# Patient Record
Sex: MALE | Race: WHITE | NOT HISPANIC OR LATINO | Employment: OTHER | URBAN - METROPOLITAN AREA
[De-identification: names, ages, dates, MRNs, and addresses within clinical notes are randomized per-mention and may not be internally consistent; named-entity substitution may affect disease eponyms.]

---

## 2019-12-27 ENCOUNTER — OFFICE VISIT (OUTPATIENT)
Dept: ORTHOPEDIC SURGERY | Facility: CLINIC | Age: 64
End: 2019-12-27

## 2019-12-27 VITALS — WEIGHT: 202 LBS | TEMPERATURE: 98.4 F | HEIGHT: 71 IN | BODY MASS INDEX: 28.28 KG/M2

## 2019-12-27 DIAGNOSIS — M25.561 ACUTE PAIN OF RIGHT KNEE: Primary | ICD-10-CM

## 2019-12-27 PROCEDURE — 99203 OFFICE O/P NEW LOW 30 MIN: CPT | Performed by: ORTHOPAEDIC SURGERY

## 2019-12-27 PROCEDURE — 73562 X-RAY EXAM OF KNEE 3: CPT | Performed by: ORTHOPAEDIC SURGERY

## 2019-12-27 RX ORDER — PROPRANOLOL HYDROCHLORIDE 10 MG/1
TABLET ORAL
COMMUNITY
Start: 2019-11-19 | End: 2021-10-27 | Stop reason: SDUPTHER

## 2019-12-27 RX ORDER — CHLORTHALIDONE 25 MG/1
TABLET ORAL
COMMUNITY
Start: 2019-11-04 | End: 2021-10-27

## 2019-12-27 RX ORDER — FLUTICASONE PROPIONATE 50 MCG
SPRAY, SUSPENSION (ML) NASAL
COMMUNITY
Start: 2015-10-22

## 2019-12-27 RX ORDER — OLMESARTAN MEDOXOMIL 20 MG/1
TABLET ORAL
COMMUNITY
Start: 2019-07-29 | End: 2021-10-27 | Stop reason: SDUPTHER

## 2019-12-27 RX ORDER — PRAVASTATIN SODIUM 20 MG
20 TABLET ORAL DAILY
COMMUNITY
Start: 2019-01-30 | End: 2021-09-01

## 2019-12-27 RX ORDER — ALPRAZOLAM 0.25 MG/1
TABLET ORAL
COMMUNITY
Start: 2019-08-19

## 2019-12-27 RX ORDER — AMLODIPINE BESYLATE 5 MG/1
TABLET ORAL
COMMUNITY
Start: 2019-11-01 | End: 2021-10-27 | Stop reason: SDUPTHER

## 2019-12-27 RX ORDER — LEVOCETIRIZINE DIHYDROCHLORIDE 5 MG/1
TABLET, FILM COATED ORAL
COMMUNITY
Start: 2016-09-05

## 2019-12-27 RX ORDER — MULTIVITAMIN
1 TABLET ORAL DAILY
COMMUNITY

## 2019-12-27 NOTE — PROGRESS NOTES
"Patient: Leonid Pittman  YOB: 1955 64 y.o. male  Medical Record Number: [id number]    Chief Complaints:   Chief Complaint   Patient presents with   • Right Knee - Establish Care, Pain       History of Present Illness:Leonid Pittman is a 64 y.o. male who presents with complaints of right knee popping and catching.  This been ongoing for about a year.  He states he had a ski related injury about 30 years ago and ever since then he has had some occasional intermittent symptoms but over the past year he has had worsening popping with knee flexion.  If feel a pop or catch which is associated with mild to moderate pain.    Allergies: No Known Allergies    Medications:   Current Outpatient Medications   Medication Sig Dispense Refill   • ALPRAZolam (XANAX) 0.25 MG tablet TK 1 T PO BID PRN     • amLODIPine (NORVASC) 5 MG tablet      • aspirin 81 MG tablet Take 81 mg by mouth.     • chlorthalidone (HYGROTON) 25 MG tablet      • fluticasone (FLONASE) 50 MCG/ACT nasal spray      • levocetirizine (XYZAL) 5 MG tablet      • Multiple Vitamin (MULTIVITAMIN) tablet Take 1 tablet by mouth Daily.     • olmesartan (BENICAR) 20 MG tablet TAKE 1/2 TABLET BY MOUTH DAILY FOR A DOSE OF 10 MG     • pravastatin (PRAVACHOL) 20 MG tablet Take 20 mg by mouth Daily.     • propranolol (INDERAL) 10 MG tablet TK 1 T PO  BID PRF TREMORS       No current facility-administered medications for this visit.          The following portions of the patient's history were reviewed and updated as appropriate: allergies, current medications, past family history, past medical history, past social history, past surgical history and problem list.    Review of Systems:   A 14 point review of systems was performed. All systems negative except pertinent positives/negative listed in HPI above    Physical Exam:   Vitals:    12/27/19 1606   Temp: 98.4 °F (36.9 °C)   TempSrc: Temporal   Weight: 91.6 kg (202 lb)   Height: 180.3 cm (71\")       General: A and " O x 3, ASA, NAD    SCLERA:    Normal    DENTITION:   Normal   Knee:  right    ALIGNMENT:     Neutral  ,   Patella tracks   midline with crepitus    GAIT:     Nonantalgic    SKIN:    No abnormality    RANGE OF MOTION:   0  -  135   DEG    STRENGTH:   5 / 5    LIGAMENTS:    No varus / valgus instability.   Negative  Lachman.    MENISCUS:     Negative   Asim       DISTAL PULSES:    Paplable    DISTAL SENSATION :   Intact    LYMPHATICS:     No   lymphadenopathy    OTHER:          - No  effusion      + Anterior crepitance with ROM      - No Swelling /  tenderness to palpation pes anserine bursa       Radiology:  Xrays 3views right knee (ap,lateral, sunrise) were ordered and reviewed for evaluation of knee pain demonstrating no significant joint space narrowing there is some mild irregularity of the superior aspect of the patellar subchondral cartilage with some osteopenia and irregular surface contour seen most clearly on the lateral view.  There are no previous films for comparison    Assessment/Plan: Right anterior knee pain and catching possible cartilage defect of the patella.  I am going get an MRI he will call back for results.      Alan Zaragoza MD  12/27/2019

## 2020-01-16 ENCOUNTER — TELEPHONE (OUTPATIENT)
Dept: ORTHOPEDIC SURGERY | Facility: CLINIC | Age: 65
End: 2020-01-16

## 2020-01-17 ENCOUNTER — TELEPHONE (OUTPATIENT)
Dept: ORTHOPEDIC SURGERY | Facility: CLINIC | Age: 65
End: 2020-01-17

## 2020-01-17 NOTE — TELEPHONE ENCOUNTER
Pt wants to make sure you're aware that he is having NO pain in the knee and wants to know if you still think a cortisone injection would help with the clicking and popping since that is his main concern.

## 2020-01-17 NOTE — TELEPHONE ENCOUNTER
Let the patient know that the MRI of his knee does show moderate amount of cartilage wear behind his kneecap as well as what appears to be an old meniscus tear, nothing acute.  Would recommend cortisone injection with me in physical therapy if still having pain

## 2020-01-17 NOTE — TELEPHONE ENCOUNTER
Per Dr. Zaragoza's notes, patient was having pain as well.  That is wonderful that he is not having any further pain.  Cortisone would only be if the knee is painful.  We can certainly try a Synvisc injection instead if he is really not having any pain but his main concern is clicking and popping.

## 2020-01-20 NOTE — TELEPHONE ENCOUNTER
Called and LVM relaying message from Chanda Monteiro. Told him to call office back so we can decide if he wants the injection or not.

## 2020-02-19 ENCOUNTER — HOSPITAL ENCOUNTER (OUTPATIENT)
Dept: GENERAL RADIOLOGY | Facility: HOSPITAL | Age: 65
Discharge: HOME | End: 2020-02-19
Admitting: FAMILY MEDICINE

## 2020-02-19 DIAGNOSIS — M54.2 NECK PAIN: ICD-10-CM

## 2020-02-19 PROCEDURE — 72050 X-RAY EXAM NECK SPINE 4/5VWS: CPT

## 2020-03-04 ENCOUNTER — TREATMENT (OUTPATIENT)
Dept: PHYSICAL THERAPY | Facility: CLINIC | Age: 65
End: 2020-03-04

## 2020-03-04 DIAGNOSIS — M54.2 PAIN, NECK: Primary | ICD-10-CM

## 2020-03-04 DIAGNOSIS — M43.6 NECK STIFFNESS: ICD-10-CM

## 2020-03-04 PROCEDURE — 97162 PT EVAL MOD COMPLEX 30 MIN: CPT | Performed by: PHYSICAL THERAPIST

## 2020-03-04 PROCEDURE — 97110 THERAPEUTIC EXERCISES: CPT | Performed by: PHYSICAL THERAPIST

## 2020-03-04 NOTE — PROGRESS NOTES
Physical Therapy Initial Evaluation and Plan of Care      Patient: Leonid Pittman   : 1955  Diagnosis/ICD-10 Code:  Pain, neck [M54.2]  Referring practitioner: Self Referring  Date of Initial Visit: 3/4/2020  Today's Date: 3/4/2020  Patient seen for 1 sessions           Subjective Evaluation    History of Present Illness  Onset date: chronic.  Mechanism of injury: Pt is a periodontist and orthodontist. He has had neck pain off and on over the years. He has had some PT for about a year, but really not much help. Has tried accupuncture (1x), dry needling, massage. The set up of his surgical area usually aggravates him. On occasion has some tingling into the L pinky. Most of the time he is rotated to the L with L sided neck pain and R sided LBP.       Patient Occupation: periodontist and orthodontist Quality of life: good    Pain  Current pain ratin  At worst pain ratin  Location: neck  Quality: sharp and tight  Exacerbated by: work duties, bike riding, turning to back up car, sometimes sleeping.    Hand dominance: right    Treatments  Current treatment: massage  Current treatment comments: acupuncture .     Patient Goals  Patient goals for therapy: decreased pain and increased motion             Objective       Postural Observations    Additional Postural Observation Details  Mild rounding of shoulders, forward head    Palpation   Left   Hypertonic in the levator scapulae, suboccipitals and upper trapezius.   Tenderness of the levator scapulae and upper trapezius.   Trigger point to levator scapulae and upper trapezius.     Right   Hypertonic in the levator scapulae, sternocleidomastoid, suboccipitals and upper trapezius.     Tenderness   Cervical Spine   Tenderness in the left 1st rib.     Additional Tenderness Details  Decreased mobility    Active Range of Motion     Additional Active Range of Motion Details  Cervical AROM:  Flexion=90%, pulling on the L  Extension=0%, painful and stiff  R  rotation=50%, pain on L  L rotation=10% pain on L   R lateral flexion=20%, pain on L  L lateral flexion=40%, pain on L    Passive Range of Motion     Additional Passive Range of Motion Details  Limited PROM of lower cervical, some pain with PA springing    Strength/Myotome Testing     Left Shoulder     Planes of Motion   Flexion: 4+   Abduction: 4+   External rotation at 0°: 5     Right Shoulder     Planes of Motion   Flexion: 5   Abduction: 5   External rotation at 0°: 5     Left Elbow   Flexion: 5  Extension: 4+    Right Elbow   Flexion: 5    Left Wrist/Hand   Wrist extension: 5  Wrist flexion: 5    Right Wrist/Hand   Wrist extension: 5  Wrist flexion: 5       See Exercise, Manual, and Modality Logs for complete treatment.     Functional outcome score: Neck Disability Index=20%    Exercises:  -supine cervical self rotational mobs with a towel, 5x 5 sec  -supine cervical nodding, 10x 5 sec hold  -shoulder rolls  -UT and levator stretching 3x20 sec        Assessment & Plan     Assessment  Impairments: abnormal or restricted ROM, activity intolerance, impaired physical strength, lacks appropriate home exercise program and pain with function  Assessment details: Leonid Pittman is a 64 y.o. year-old male referred to physical therapy for neck pain. He presents with an evolving clinical presentation.  He has comorbidities of a physical job and no known personal factors that may affect his progress in the plan of care. He has limited cervical AROM with pain on the L during most motions. He has decreased PROM of the mid to lower cervical spine, some pain with PA glides. His sensation is intact and symmetrical and no radicular symptoms today.  Pt would benefit from therapy to help improve his ability to drive, sleep, and perform work duties with less pain.    Prognosis: good  Functional Limitations: sleeping, uncomfortable because of pain, moving in bed, sitting and unable to perform repetitive tasks  Goals  Plan Goals: STG:  6 wks  1. Patient will be independent with education for symptom management, joint protection and strategies to minimize stress on affected tissues  2. Pt to improve pain to no more than 5/10 with ADLs and work    LT wks  1. Pt to improve pain to no more than 2/10 with ADLs and work  2. Pt to improve NDI score from 20% to 10% for overall functional improvement  3. Pt to improve cervical AROM in L rotation from 10% to 50% with no increased pain for greater ease with driving and surgeries at work  4. Pt to improve cervical extension to 25% without increased pain  5. Pt to be independent with HEP for symptom management and strengthening      Plan  Therapy options: will be seen for skilled physical therapy services  Planned modality interventions: high voltage pulsed current (pain management), TENS, traction and ultrasound  Other planned modality interventions: dry needling  Planned therapy interventions: abdominal trunk stabilization, ADL retraining, body mechanics training, flexibility, functional ROM exercises, home exercise program, IADL retraining, joint mobilization, manual therapy, neuromuscular re-education, postural training, soft tissue mobilization, spinal/joint mobilization, strengthening, stretching and therapeutic activities  Frequency: 1x week  Duration in weeks: 20  Treatment plan discussed with: patient        Timed:  Manual Therapy:         mins  71147;  Therapeutic Exercise:    10     mins  48796;     Neuromuscular Sunday:        mins  37107;    Therapeutic Activity:          mins  56134;     Gait Training:           mins  68441;     Ultrasound:          mins  13867;    Electrical Stimulation:         mins  73994 ( );  Iontophoresis         mins 58041  Dry Needling        mins      Untimed:  Electrical Stimulation:         mins  77074 ( );  Mechanical Traction:         mins  12998;     Timed Treatment:   10   mins   Total Treatment:     45   mins    PT SIGNATURE: Dolores Dexter,  PT   DATE TREATMENT INITIATED: 3/4/2020    Initial Certification  Certification Period: 6/2/2020  I certify that the therapy services are furnished while this patient is under my care.  The services outlined above are required by this patient, and will be reviewed every 90 days.     PHYSICIAN: Referring, Self      DATE:     Please sign and return via fax to  .. Thank you, [facility].

## 2020-03-11 ENCOUNTER — TREATMENT (OUTPATIENT)
Dept: PHYSICAL THERAPY | Facility: CLINIC | Age: 65
End: 2020-03-11

## 2020-03-11 DIAGNOSIS — M54.2 PAIN, NECK: Primary | ICD-10-CM

## 2020-03-11 DIAGNOSIS — M43.6 NECK STIFFNESS: ICD-10-CM

## 2020-03-11 PROCEDURE — DRYNDL PR CUSTOM DRY NEEDLING SELF PAY: Performed by: PHYSICAL THERAPIST

## 2020-03-11 PROCEDURE — 97140 MANUAL THERAPY 1/> REGIONS: CPT | Performed by: PHYSICAL THERAPIST

## 2020-03-11 NOTE — PROGRESS NOTES
Physical Therapy Daily Progress Note    Patient: Leonid Pittman   : 1955  Diagnosis/ICD-10 Code:  Pain, neck [M54.2]  Referring practitioner: Self Referring  Date of Initial Visit: Type: THERAPY  Noted: 3/4/2020  Today's Date: 3/11/2020  Patient seen for 2 sessions           Subjective Pt has had some pretty painful days/nights. When he woke up yesterday am, it was a 10/10.    Objective   See Exercise, Manual, and Modality Logs for complete treatment.     Manual:  STM to B UT, levator and cervical PVMs with suboccipital release, gentle upglides for rotation    Dry needling, using threading and direct techniques, obtaining written and verbal consent to treat after discussing benefits and risks.   Patient position during treatment: supine. Muscles treated: B UT and levator. Response: Multiple LTRs.Clean needle technique observed at all times, precautions for lung fields, neurovascular structures observed. Manual palpation and assessment performed before, during, and after session.          Assessment/Plan  Pt is had increased pain over the past couple of days, so much yesterday that he was unsure he could even work. Today the pain is 6/10 with motion restricted. He tolerated the dry needling well and had decreased pain and increased motion following the treatment         Timed:    Manual Therapy:    30     mins  32720;  Therapeutic Exercise:         mins  92909;     Neuromuscular Sunday:        mins  94927;    Therapeutic Activity:          mins  58909;     Gait Training:           mins  87982;     Ultrasound:          mins  96686;    Electrical Stimulation:         mins  23205 ( );  Iontophoresis         mins 26122;  Aquatic Therapy         mins 01745;  Dry Needling              12     mins    Untimed:  Electrical Stimulation:         mins  92810 ( );  Mechanical Traction:         mins  38784;     Timed Treatment:   42   mins   Total Treatment:     42   mins  Dolores Dexter, PT  Physical  Therapist

## 2020-03-18 ENCOUNTER — TREATMENT (OUTPATIENT)
Dept: PHYSICAL THERAPY | Facility: CLINIC | Age: 65
End: 2020-03-18

## 2020-03-18 DIAGNOSIS — M43.6 NECK STIFFNESS: ICD-10-CM

## 2020-03-18 DIAGNOSIS — M54.2 PAIN, NECK: Primary | ICD-10-CM

## 2020-03-18 PROCEDURE — 97140 MANUAL THERAPY 1/> REGIONS: CPT | Performed by: PHYSICAL THERAPIST

## 2020-03-18 PROCEDURE — DRYNDL PR CUSTOM DRY NEEDLING SELF PAY: Performed by: PHYSICAL THERAPIST

## 2020-03-18 NOTE — PROGRESS NOTES
Physical Therapy Daily Progress Note    Patient: Leonid Pittman   : 1955  Diagnosis/ICD-10 Code:  Pain, neck [M54.2]  Referring practitioner: Self Referring  Date of Initial Visit: Type: THERAPY  Noted: 3/4/2020  Today's Date: 3/18/2020  Patient seen for 3 sessions           Subjective  Pain level 6/10 today. Had a hair cut earlier and had a hard time leaving back into the bowl.     Objective   See Exercise, Manual, and Modality Logs for complete treatment.     Manual:  STM to B UT, levator and cervical PVMs with suboccipital release, ischemic pressure to the L UT to release trigger point       Dry needling, using threading and direct techniques, obtaining verbal consent to treat after discussing benefits and risks.   Patient position during treatment: supine. Muscles treated: B UT and levator. Response: Multiple LTRs.Clean needle technique observed at all times, precautions for lung fields, neurovascular structures observed. Manual palpation and assessment performed before, during, and after session.       Assessment/Plan  Pt was very sensitive on the L UT today prior to starting manual and dry needling. It did settle down after increasing the height of his head on the bed. He is responding well to the needling, helping to reduce the muscle tension and pain         Timed:    Manual Therapy:    30     mins  61563;  Therapeutic Exercise:         mins  13506;     Neuromuscular Sunday:        mins  37200;    Therapeutic Activity:          mins  68382;     Gait Training:           mins  59443;     Ultrasound:          mins  89415;    Electrical Stimulation:         mins  05711 ( );  Iontophoresis         mins 08289;  Aquatic Therapy         mins 39212;  Dry Needling              15     mins    Untimed:  Electrical Stimulation:         mins  16880 ( );  Mechanical Traction:         mins  32992;     Timed Treatment:   45   mins   Total Treatment:     45   mins  Dolores Dexter, PT  Physical  Therapist

## 2020-07-01 ENCOUNTER — DOCUMENTATION (OUTPATIENT)
Dept: PHYSICAL THERAPY | Facility: CLINIC | Age: 65
End: 2020-07-01

## 2020-07-01 DIAGNOSIS — M43.6 NECK STIFFNESS: ICD-10-CM

## 2020-07-01 DIAGNOSIS — M54.2 PAIN, NECK: Primary | ICD-10-CM

## 2021-09-01 ENCOUNTER — OFFICE VISIT (OUTPATIENT)
Dept: INTERNAL MEDICINE | Facility: CLINIC | Age: 66
End: 2021-09-01

## 2021-09-01 VITALS
HEART RATE: 65 BPM | BODY MASS INDEX: 25.86 KG/M2 | RESPIRATION RATE: 16 BRPM | DIASTOLIC BLOOD PRESSURE: 78 MMHG | WEIGHT: 184.7 LBS | TEMPERATURE: 97.3 F | OXYGEN SATURATION: 97 % | HEIGHT: 71 IN | SYSTOLIC BLOOD PRESSURE: 140 MMHG

## 2021-09-01 DIAGNOSIS — E78.2 MIXED HYPERLIPIDEMIA: ICD-10-CM

## 2021-09-01 DIAGNOSIS — F41.1 GAD (GENERALIZED ANXIETY DISORDER): ICD-10-CM

## 2021-09-01 DIAGNOSIS — I10 ESSENTIAL HYPERTENSION: ICD-10-CM

## 2021-09-01 DIAGNOSIS — H61.23 BILATERAL IMPACTED CERUMEN: Primary | ICD-10-CM

## 2021-09-01 PROCEDURE — 99204 OFFICE O/P NEW MOD 45 MIN: CPT | Performed by: INTERNAL MEDICINE

## 2021-09-01 RX ORDER — ZOLPIDEM TARTRATE 10 MG/1
10 TABLET ORAL NIGHTLY PRN
COMMUNITY
Start: 2021-08-26

## 2021-09-01 RX ORDER — ATORVASTATIN CALCIUM 40 MG/1
40 TABLET, FILM COATED ORAL DAILY
COMMUNITY
Start: 2021-04-21 | End: 2021-10-27 | Stop reason: SDUPTHER

## 2021-09-01 NOTE — PROGRESS NOTES
"Chief Complaint  Ear Fullness (RT EAR CLOGGED)    Subjective          Leonid Pittman presents to [facility]  Right ear fullness, feels clogged, some ringing, no pain; no fevers; present for last 1-2 weeks; never had issues before    HTN, well managed without side effects, no lows, no chest pain, sob, headaches, vision changes    HLD, doing well with meds, replacement    ALBIN, recent divorce, doing ok he states, rare use of xanax    Insomnia, related to recent stressors, doing well without issues, using ambien as needed      Objective   Vital Signs:   /78   Pulse 65   Temp 97.3 °F (36.3 °C)   Resp 16   Ht 180.3 cm (71\")   Wt 83.8 kg (184 lb 11.2 oz)   SpO2 97%   BMI 25.76 kg/m²     Physical Exam  Vitals and nursing note reviewed.   Constitutional:       General: He is not in acute distress.     Appearance: Normal appearance.   HENT:      Head: Normocephalic and atraumatic.      Right Ear: External ear normal. There is impacted cerumen.      Left Ear: External ear normal. There is impacted cerumen.      Nose: Nose normal.      Mouth/Throat:      Mouth: Mucous membranes are moist.      Pharynx: Oropharynx is clear.   Eyes:      Extraocular Movements: Extraocular movements intact.      Conjunctiva/sclera: Conjunctivae normal.      Pupils: Pupils are equal, round, and reactive to light.   Cardiovascular:      Rate and Rhythm: Normal rate and regular rhythm.      Pulses: Normal pulses.      Heart sounds: Normal heart sounds. No murmur heard.   No gallop.    Pulmonary:      Effort: Pulmonary effort is normal.      Breath sounds: Normal breath sounds.   Abdominal:      General: Abdomen is flat. Bowel sounds are normal. There is no distension.      Palpations: Abdomen is soft. There is no mass.      Tenderness: There is no abdominal tenderness.   Musculoskeletal:         General: No swelling. Normal range of motion.      Cervical back: Normal range of motion and " neck supple.   Skin:     General: Skin is warm and dry.      Findings: No rash.   Neurological:      General: No focal deficit present.      Mental Status: He is alert and oriented to person, place, and time. Mental status is at baseline.   Psychiatric:         Mood and Affect: Mood normal.         Behavior: Behavior normal.        Result Review :          Ear Cerumen Removal    Date/Time: 9/7/2021 9:12 PM  Performed by: Tulio Carbone MD  Authorized by: Tulio Carbone MD   Location details: left ear and right ear  Patient tolerance: patient tolerated the procedure well with no immediate complications  Procedure type: instrumentation and irrigation   Sedation:  Patient sedated: no              Assessment and Plan    Diagnoses and all orders for this visit:    1. Bilateral impacted cerumen (Primary)  -     Ear Cerumen Removal  - cerumen removed today, doing well without issues; no side effects after procedure, tolerated well  - discussed home management, prevention    2. Essential hypertension  - at goal today  - continue amlodipine 5mg, olmesartan 20mg, propranolol 10mg, chlorthalidone 25mg daily, doing well without issues or side effects  - conitnue home log, limit salt; increase exercise, diet    3. Mixed hyperlipidemia  - continue atorva 40mg daily without issues or side effects, will monitor closely    4. ALBIN (generalized anxiety disorder)  - conitnue xanax as needed, will like to limit long term use  - discuss further with getting records for review      Follow Up   No follow-ups on file.  Patient was given instructions and counseling regarding his condition or for health maintenance advice. Please see specific information pulled into the AVS if appropriate.

## 2021-09-07 PROCEDURE — 69210 REMOVE IMPACTED EAR WAX UNI: CPT | Performed by: INTERNAL MEDICINE

## 2021-10-27 ENCOUNTER — OFFICE VISIT (OUTPATIENT)
Dept: INTERNAL MEDICINE | Facility: CLINIC | Age: 66
End: 2021-10-27

## 2021-10-27 VITALS
OXYGEN SATURATION: 99 % | HEART RATE: 63 BPM | BODY MASS INDEX: 26.18 KG/M2 | SYSTOLIC BLOOD PRESSURE: 124 MMHG | HEIGHT: 71 IN | RESPIRATION RATE: 18 BRPM | TEMPERATURE: 97.2 F | DIASTOLIC BLOOD PRESSURE: 70 MMHG | WEIGHT: 187 LBS

## 2021-10-27 DIAGNOSIS — Z11.59 ENCOUNTER FOR HCV SCREENING TEST FOR LOW RISK PATIENT: ICD-10-CM

## 2021-10-27 DIAGNOSIS — Z11.4 ENCOUNTER FOR SCREENING FOR HIV: ICD-10-CM

## 2021-10-27 DIAGNOSIS — E78.2 MIXED HYPERLIPIDEMIA: ICD-10-CM

## 2021-10-27 DIAGNOSIS — C61 PROSTATE CANCER (HCC): ICD-10-CM

## 2021-10-27 DIAGNOSIS — Z00.00 WELL ADULT EXAM: Primary | ICD-10-CM

## 2021-10-27 DIAGNOSIS — I10 ESSENTIAL HYPERTENSION: ICD-10-CM

## 2021-10-27 LAB
BILIRUB BLD-MCNC: NEGATIVE MG/DL
CLARITY, POC: CLEAR
COLOR UR: YELLOW
EXPIRATION DATE: NORMAL
GLUCOSE UR STRIP-MCNC: NEGATIVE MG/DL
KETONES UR QL: NEGATIVE
LEUKOCYTE EST, POC: NEGATIVE
Lab: NORMAL
NITRITE UR-MCNC: NEGATIVE MG/ML
PH UR: 7.5 [PH] (ref 5–8)
PROT UR STRIP-MCNC: NEGATIVE MG/DL
RBC # UR STRIP: NEGATIVE /UL
SP GR UR: 1.02 (ref 1–1.03)
UROBILINOGEN UR QL: NORMAL

## 2021-10-27 PROCEDURE — 99396 PREV VISIT EST AGE 40-64: CPT | Performed by: INTERNAL MEDICINE

## 2021-10-27 PROCEDURE — 93000 ELECTROCARDIOGRAM COMPLETE: CPT | Performed by: INTERNAL MEDICINE

## 2021-10-27 PROCEDURE — 81003 URINALYSIS AUTO W/O SCOPE: CPT | Performed by: INTERNAL MEDICINE

## 2021-10-27 RX ORDER — PROPRANOLOL HYDROCHLORIDE 10 MG/1
10 TABLET ORAL 2 TIMES DAILY PRN
Qty: 180 TABLET | Refills: 2 | Status: SHIPPED | OUTPATIENT
Start: 2021-10-27 | End: 2022-05-25 | Stop reason: SDUPTHER

## 2021-10-27 RX ORDER — AMLODIPINE BESYLATE 5 MG/1
5 TABLET ORAL DAILY
Qty: 90 TABLET | Refills: 2 | Status: SHIPPED | OUTPATIENT
Start: 2021-10-27 | End: 2022-05-25 | Stop reason: SDUPTHER

## 2021-10-27 RX ORDER — OLMESARTAN MEDOXOMIL 20 MG/1
20 TABLET ORAL DAILY
Qty: 90 TABLET | Refills: 2 | Status: SHIPPED | OUTPATIENT
Start: 2021-10-27 | End: 2022-05-25 | Stop reason: SDUPTHER

## 2021-10-27 RX ORDER — TADALAFIL 5 MG/1
TABLET ORAL
COMMUNITY
Start: 2021-08-04 | End: 2022-05-25 | Stop reason: SDUPTHER

## 2021-10-27 RX ORDER — ATORVASTATIN CALCIUM 40 MG/1
40 TABLET, FILM COATED ORAL DAILY
Qty: 90 TABLET | Refills: 2 | Status: SHIPPED | OUTPATIENT
Start: 2021-10-27 | End: 2022-04-06

## 2021-10-27 NOTE — PROGRESS NOTES
"Chief Complaint   Patient presents with   • Annual Exam       Subjective   Leonid Pittman is a 66 y.o. male.     History of Present Illness     The following portions of the patient's history were reviewed and updated as appropriate: allergies, current medications, past family history, past medical history, past social history, past surgical history, and problem list.    Review of Systems      Objective   Body mass index is 26.08 kg/m².   Vitals:    10/27/21 1443   BP: 124/70   Pulse: 63   Resp: 18   Temp: 97.2 °F (36.2 °C)   SpO2: 99%   Weight: 84.8 kg (187 lb)   Height: 180.3 cm (71\")        ECG 12 Lead    Date/Time: 10/27/2021 8:33 PM  Performed by: Tulio Carbone MD  Authorized by: Tulio Carbone MD   Comparison: not compared with previous ECG   Previous ECG: no previous ECG available  Rhythm: sinus rhythm  Rate: normal  Conduction: conduction normal  ST Segments: ST segments normal  T Waves: T waves normal  QRS axis: normal  Other: no other findings    Clinical impression: normal ECG            Physical Exam  Vitals and nursing note reviewed.   Constitutional:       General: He is not in acute distress.     Appearance: Normal appearance.   HENT:      Head: Normocephalic and atraumatic.      Right Ear: External ear normal.      Left Ear: External ear normal.      Nose: Nose normal.      Mouth/Throat:      Mouth: Mucous membranes are moist.      Pharynx: Oropharynx is clear.   Eyes:      Extraocular Movements: Extraocular movements intact.      Conjunctiva/sclera: Conjunctivae normal.      Pupils: Pupils are equal, round, and reactive to light.   Cardiovascular:      Rate and Rhythm: Normal rate and regular rhythm.      Pulses: Normal pulses.      Heart sounds: Normal heart sounds. No murmur heard.  No gallop.    Pulmonary:      Effort: Pulmonary effort is normal.      Breath sounds: Normal breath sounds.   Abdominal:      General: Abdomen is flat. Bowel sounds are normal. There is no distension.      " Palpations: Abdomen is soft. There is no mass.      Tenderness: There is no abdominal tenderness.   Musculoskeletal:         General: No swelling. Normal range of motion.      Cervical back: Normal range of motion and neck supple.   Skin:     General: Skin is warm and dry.      Findings: No rash.   Neurological:      General: No focal deficit present.      Mental Status: He is alert and oriented to person, place, and time. Mental status is at baseline.   Psychiatric:         Mood and Affect: Mood normal.         Behavior: Behavior normal.           Current Outpatient Medications:   •  ALPRAZolam (XANAX) 0.25 MG tablet, TK 1 T PO BID PRN, Disp: , Rfl:   •  amLODIPine (NORVASC) 5 MG tablet, Take 1 tablet by mouth Daily., Disp: 90 tablet, Rfl: 2  •  aspirin 81 MG tablet, Take 81 mg by mouth., Disp: , Rfl:   •  atorvastatin (LIPITOR) 40 MG tablet, Take 1 tablet by mouth Daily., Disp: 90 tablet, Rfl: 2  •  fluticasone (FLONASE) 50 MCG/ACT nasal spray, , Disp: , Rfl:   •  levocetirizine (XYZAL) 5 MG tablet, , Disp: , Rfl:   •  Multiple Vitamin (MULTIVITAMIN) tablet, Take 1 tablet by mouth Daily., Disp: , Rfl:   •  olmesartan (BENICAR) 20 MG tablet, Take 1 tablet by mouth Daily., Disp: 90 tablet, Rfl: 2  •  propranolol (INDERAL) 10 MG tablet, Take 1 tablet by mouth 2 (Two) Times a Day As Needed (tremor)., Disp: 180 tablet, Rfl: 2  •  tadalafil (CIALIS) 5 MG tablet, , Disp: , Rfl:   •  zolpidem (AMBIEN) 10 MG tablet, Take 10 mg by mouth At Night As Needed., Disp: , Rfl:      Lab Results   Component Value Date    HGBA1C 5.6 01/06/2020    TSH 1.250 07/28/2021    PSFL85GT 74.1 11/09/2019    PSA 0.01 01/27/2021        Health Maintenance   Topic Date Due   • INFLUENZA VACCINE  10/27/2022 (Originally 8/1/2021)   • LIPID PANEL  01/27/2022   • TDAP/TD VACCINES (3 - Td or Tdap) 06/12/2031   • ZOSTER VACCINE  Completed        Immunization History   Administered Date(s) Administered   • COVID-19 (PFIZER) 01/08/2021, 01/29/2021,  09/27/2021       Assessment/Plan   Diagnoses and all orders for this visit:    1. Well adult exam (Primary)  -     CBC & Differential  -     Comprehensive Metabolic Panel  -     Lipid Panel With / Chol / HDL Ratio  -     Hemoglobin A1c    2. Encounter for screening for HIV  -     HIV-1 / O / 2 Ag / Antibody 4th Generation    3. Encounter for HCV screening test for low risk patient  -     Hepatitis C Antibody    4. Essential hypertension  -     ECG 12 Lead  -     POCT urinalysis dipstick, automated  -     amLODIPine (NORVASC) 5 MG tablet; Take 1 tablet by mouth Daily.  Dispense: 90 tablet; Refill: 2  -     olmesartan (BENICAR) 20 MG tablet; Take 1 tablet by mouth Daily.  Dispense: 90 tablet; Refill: 2  -     propranolol (INDERAL) 10 MG tablet; Take 1 tablet by mouth 2 (Two) Times a Day As Needed (tremor).  Dispense: 180 tablet; Refill: 2    5. Prostate cancer (HCC)    6. Mixed hyperlipidemia  -     atorvastatin (LIPITOR) 40 MG tablet; Take 1 tablet by mouth Daily.  Dispense: 90 tablet; Refill: 2           Well adult exam  - labs checked and evaluated    Colonoscopy - normal in 11/2020, with Yolanda at [facility], getting records  Prostate - previous hsitory prostate cancer, following with urolgoy, defer PSA to them  Glaucoma - yearly  AAA - checking  Lung cancer - checking  HIV - checking  HCV - checking  DM - checking  HLD - checking  Smoking - no  Depression - depression/anxiety, well managed, following with psychiatry  Vaccines - up to date  Falls - no issues  Alcohol Screening - no issues    Discussed mental health, sexual health, substance use, abuse, anticipatory guidance given.      No follow-ups on file.     Tulio Carbone MD  [facility]  Internal Medicine and Pediatrics  Phone: [phone]  Fax: [phone]

## 2021-10-28 LAB
ALBUMIN SERPL-MCNC: 4.7 G/DL (ref 3.8–4.8)
ALBUMIN/GLOB SERPL: 2.2 {RATIO} (ref 1.2–2.2)
ALP SERPL-CCNC: 76 IU/L (ref 44–121)
ALT SERPL-CCNC: 29 IU/L (ref 0–44)
AST SERPL-CCNC: 26 IU/L (ref 0–40)
BASOPHILS # BLD AUTO: 0 X10E3/UL (ref 0–0.2)
BASOPHILS NFR BLD AUTO: 0 %
BILIRUB SERPL-MCNC: 1 MG/DL (ref 0–1.2)
BUN SERPL-MCNC: 14 MG/DL (ref 8–27)
BUN/CREAT SERPL: 15 (ref 10–24)
CALCIUM SERPL-MCNC: 9.5 MG/DL (ref 8.6–10.2)
CHLORIDE SERPL-SCNC: 102 MMOL/L (ref 96–106)
CHOLEST SERPL-MCNC: 128 MG/DL (ref 100–199)
CHOLEST/HDLC SERPL: 2 RATIO (ref 0–5)
CO2 SERPL-SCNC: 27 MMOL/L (ref 20–29)
CREAT SERPL-MCNC: 0.92 MG/DL (ref 0.76–1.27)
EOSINOPHIL # BLD AUTO: 0.1 X10E3/UL (ref 0–0.4)
EOSINOPHIL NFR BLD AUTO: 2 %
ERYTHROCYTE [DISTWIDTH] IN BLOOD BY AUTOMATED COUNT: 12.5 % (ref 11.6–15.4)
GLOBULIN SER CALC-MCNC: 2.1 G/DL (ref 1.5–4.5)
GLUCOSE SERPL-MCNC: 104 MG/DL (ref 65–99)
HBA1C MFR BLD: 5.4 % (ref 4.8–5.6)
HCT VFR BLD AUTO: 46.6 % (ref 37.5–51)
HCV AB S/CO SERPL IA: <0.1 S/CO RATIO (ref 0–0.9)
HDLC SERPL-MCNC: 65 MG/DL
HGB BLD-MCNC: 16 G/DL (ref 13–17.7)
HIV 1+2 AB+HIV1 P24 AG SERPL QL IA: NON REACTIVE
IMM GRANULOCYTES # BLD AUTO: 0 X10E3/UL (ref 0–0.1)
IMM GRANULOCYTES NFR BLD AUTO: 0 %
LDLC SERPL CALC-MCNC: 50 MG/DL (ref 0–99)
LYMPHOCYTES # BLD AUTO: 0.9 X10E3/UL (ref 0.7–3.1)
LYMPHOCYTES NFR BLD AUTO: 17 %
MCH RBC QN AUTO: 31.1 PG (ref 26.6–33)
MCHC RBC AUTO-ENTMCNC: 34.3 G/DL (ref 31.5–35.7)
MCV RBC AUTO: 91 FL (ref 79–97)
MONOCYTES # BLD AUTO: 0.7 X10E3/UL (ref 0.1–0.9)
MONOCYTES NFR BLD AUTO: 13 %
NEUTROPHILS # BLD AUTO: 3.5 X10E3/UL (ref 1.4–7)
NEUTROPHILS NFR BLD AUTO: 68 %
PLATELET # BLD AUTO: 231 X10E3/UL (ref 150–450)
POTASSIUM SERPL-SCNC: 4.6 MMOL/L (ref 3.5–5.2)
PROT SERPL-MCNC: 6.8 G/DL (ref 6–8.5)
RBC # BLD AUTO: 5.14 X10E6/UL (ref 4.14–5.8)
SODIUM SERPL-SCNC: 142 MMOL/L (ref 134–144)
TRIGL SERPL-MCNC: 61 MG/DL (ref 0–149)
VLDLC SERPL CALC-MCNC: 13 MG/DL (ref 5–40)
WBC # BLD AUTO: 5.2 X10E3/UL (ref 3.4–10.8)

## 2022-04-05 DIAGNOSIS — E78.2 MIXED HYPERLIPIDEMIA: ICD-10-CM

## 2022-04-06 RX ORDER — ATORVASTATIN CALCIUM 40 MG/1
40 TABLET, FILM COATED ORAL DAILY
Qty: 90 TABLET | Refills: 3 | Status: SHIPPED | OUTPATIENT
Start: 2022-04-06 | End: 2022-05-25 | Stop reason: SDUPTHER

## 2022-04-06 NOTE — TELEPHONE ENCOUNTER
Rx Refill Note  Requested Prescriptions     Pending Prescriptions Disp Refills   • atorvastatin (LIPITOR) 40 MG tablet [Pharmacy Med Name: ATORVASTATIN 40 MG TABLET 40 Tablet] 90 tablet 3     Sig: TAKE 1 TABLET BY MOUTH DAILY.      Last office visit with prescribing clinician: 10/27/2021      Next office visit with prescribing clinician: Visit date not found            Sapphire Minor MA  04/06/22, 07:23 EDT

## 2022-05-25 ENCOUNTER — OFFICE VISIT (OUTPATIENT)
Dept: INTERNAL MEDICINE | Facility: CLINIC | Age: 67
End: 2022-05-25

## 2022-05-25 VITALS
SYSTOLIC BLOOD PRESSURE: 132 MMHG | WEIGHT: 184 LBS | HEART RATE: 74 BPM | OXYGEN SATURATION: 97 % | TEMPERATURE: 97.3 F | RESPIRATION RATE: 16 BRPM | DIASTOLIC BLOOD PRESSURE: 72 MMHG | HEIGHT: 71 IN | BODY MASS INDEX: 25.76 KG/M2

## 2022-05-25 DIAGNOSIS — I10 ESSENTIAL HYPERTENSION: ICD-10-CM

## 2022-05-25 DIAGNOSIS — R73.01 ELEVATED FASTING GLUCOSE: Primary | ICD-10-CM

## 2022-05-25 DIAGNOSIS — D72.810 LYMPHOPENIA: ICD-10-CM

## 2022-05-25 DIAGNOSIS — H61.23 BILATERAL IMPACTED CERUMEN: ICD-10-CM

## 2022-05-25 DIAGNOSIS — E78.2 MIXED HYPERLIPIDEMIA: ICD-10-CM

## 2022-05-25 PROCEDURE — 99214 OFFICE O/P EST MOD 30 MIN: CPT | Performed by: INTERNAL MEDICINE

## 2022-05-25 RX ORDER — PROPRANOLOL HYDROCHLORIDE 10 MG/1
10 TABLET ORAL 2 TIMES DAILY PRN
Qty: 180 TABLET | Refills: 2 | Status: SHIPPED | OUTPATIENT
Start: 2022-05-25

## 2022-05-25 RX ORDER — ATORVASTATIN CALCIUM 40 MG/1
40 TABLET, FILM COATED ORAL DAILY
Qty: 90 TABLET | Refills: 3 | Status: SHIPPED | OUTPATIENT
Start: 2022-05-25

## 2022-05-25 RX ORDER — AMLODIPINE BESYLATE 5 MG/1
5 TABLET ORAL DAILY
Qty: 90 TABLET | Refills: 2 | Status: SHIPPED | OUTPATIENT
Start: 2022-05-25 | End: 2022-09-30

## 2022-05-25 RX ORDER — OLMESARTAN MEDOXOMIL 20 MG/1
20 TABLET ORAL DAILY
Qty: 90 TABLET | Refills: 2 | Status: SHIPPED | OUTPATIENT
Start: 2022-05-25 | End: 2022-09-30

## 2022-05-25 RX ORDER — TADALAFIL 5 MG/1
5 TABLET ORAL DAILY PRN
Qty: 30 TABLET | Refills: 3 | Status: SHIPPED | OUTPATIENT
Start: 2022-05-25 | End: 2022-11-29 | Stop reason: SDUPTHER

## 2022-05-25 NOTE — PROGRESS NOTES
"Chief Complaint  Hyperlipidemia and Hypertension    Subjective          Leonid Pittman presents to [facility]  HLD, HTN, doing well, no chest pain, sob, headaches, vision changes, no lows; keeping home log, normal    ALBIN, MDD, doing well without issues; no medication side effects; no thoughts of hurting self, others, suicide; mood is good though with recent divorce and passing of his dog has been seeing his psychiatrist for TMS therapy and this is helping    Radical prostatectomy, following yearly with urology for PSA, has been undetectable; does get T pellets through general surgeon doing hormonal therapy      Objective   Vital Signs:  /72 (BP Location: Left arm, Patient Position: Sitting, Cuff Size: Large Adult)   Pulse 74   Temp 97.3 °F (36.3 °C) (Temporal)   Resp 16   Ht 180.3 cm (71\")   Wt 83.5 kg (184 lb)   SpO2 97%   BMI 25.66 kg/m²         Physical Exam  Vitals and nursing note reviewed.   Constitutional:       General: He is not in acute distress.     Appearance: Normal appearance.   HENT:      Head: Normocephalic and atraumatic.      Right Ear: There is impacted cerumen.      Left Ear: There is impacted cerumen.      Nose: Nose normal.      Mouth/Throat:      Mouth: Mucous membranes are moist.      Pharynx: Oropharynx is clear.   Eyes:      Extraocular Movements: Extraocular movements intact.      Conjunctiva/sclera: Conjunctivae normal.      Pupils: Pupils are equal, round, and reactive to light.   Cardiovascular:      Rate and Rhythm: Normal rate and regular rhythm.      Pulses: Normal pulses.      Heart sounds: Normal heart sounds. No murmur heard.    No gallop.   Pulmonary:      Effort: Pulmonary effort is normal.      Breath sounds: Normal breath sounds.   Abdominal:      General: Abdomen is flat. Bowel sounds are normal. There is no distension.      Palpations: Abdomen is soft. There is no mass.      Tenderness: There is no abdominal " tenderness.   Musculoskeletal:         General: No swelling. Normal range of motion.      Cervical back: Normal range of motion and neck supple.   Skin:     General: Skin is warm and dry.      Findings: No rash.   Neurological:      General: No focal deficit present.      Mental Status: He is alert and oriented to person, place, and time. Mental status is at baseline.   Psychiatric:         Mood and Affect: Mood normal.         Behavior: Behavior normal.        Result Review :                 Assessment and Plan    Diagnoses and all orders for this visit:    1. Elevated fasting glucose (Primary)  -     Hemoglobin A1c    2. Essential hypertension  -     Comprehensive Metabolic Panel  -     Lipid Panel With / Chol / HDL Ratio  -     propranolol (INDERAL) 10 MG tablet; Take 1 tablet by mouth 2 (Two) Times a Day As Needed (tremor).  Dispense: 180 tablet; Refill: 2  -     olmesartan (BENICAR) 20 MG tablet; Take 1 tablet by mouth Daily.  Dispense: 90 tablet; Refill: 2  -     amLODIPine (NORVASC) 5 MG tablet; Take 1 tablet by mouth Daily.  Dispense: 90 tablet; Refill: 2    3. Mixed hyperlipidemia  -     Lipid Panel With / Chol / HDL Ratio  -     atorvastatin (LIPITOR) 40 MG tablet; Take 1 tablet by mouth Daily.  Dispense: 90 tablet; Refill: 3    4. Lymphopenia  -     CBC w AUTO Differential    5. Bilateral impacted cerumen  -     Ear Cerumen Removal    Other orders  -     tadalafil (CIALIS) 5 MG tablet; Take 1 tablet by mouth Daily As Needed for Erectile Dysfunction.  Dispense: 30 tablet; Refill: 3    - continue olmesartan and amlodipine for HTN, doing well and at goal  - cotnineu atorvastatin for HLD, doing well without issues or side effects  - continue intermittent xanax for anxiety, keep psych appt; doing well with this, using xanax once yearly or so  - check labs as above, adjust meds  - rtc wellness 6 months or sooner as needed         Follow Up   No follow-ups on file.  Patient was given instructions and counseling  regarding his condition or for health maintenance advice. Please see specific information pulled into the AVS if appropriate.

## 2022-05-26 LAB
ALBUMIN SERPL-MCNC: 4.8 G/DL (ref 3.8–4.8)
ALBUMIN/GLOB SERPL: 3 {RATIO} (ref 1.2–2.2)
ALP SERPL-CCNC: 80 IU/L (ref 44–121)
ALT SERPL-CCNC: 35 IU/L (ref 0–44)
AST SERPL-CCNC: 30 IU/L (ref 0–40)
BASOPHILS # BLD AUTO: 0 X10E3/UL (ref 0–0.2)
BASOPHILS NFR BLD AUTO: 0 %
BILIRUB SERPL-MCNC: 1 MG/DL (ref 0–1.2)
BUN SERPL-MCNC: 14 MG/DL (ref 8–27)
BUN/CREAT SERPL: 14 (ref 10–24)
CALCIUM SERPL-MCNC: 9.5 MG/DL (ref 8.6–10.2)
CHLORIDE SERPL-SCNC: 100 MMOL/L (ref 96–106)
CHOLEST SERPL-MCNC: 114 MG/DL (ref 100–199)
CHOLEST/HDLC SERPL: 1.7 RATIO (ref 0–5)
CO2 SERPL-SCNC: 25 MMOL/L (ref 20–29)
CREAT SERPL-MCNC: 1.02 MG/DL (ref 0.76–1.27)
EGFRCR SERPLBLD CKD-EPI 2021: 82 ML/MIN/1.73
EOSINOPHIL # BLD AUTO: 0 X10E3/UL (ref 0–0.4)
EOSINOPHIL NFR BLD AUTO: 1 %
ERYTHROCYTE [DISTWIDTH] IN BLOOD BY AUTOMATED COUNT: 13.2 % (ref 11.6–15.4)
GLOBULIN SER CALC-MCNC: 1.6 G/DL (ref 1.5–4.5)
GLUCOSE SERPL-MCNC: 96 MG/DL (ref 65–99)
HBA1C MFR BLD: 5.5 % (ref 4.8–5.6)
HCT VFR BLD AUTO: 44.4 % (ref 37.5–51)
HDLC SERPL-MCNC: 69 MG/DL
HGB BLD-MCNC: 14.7 G/DL (ref 13–17.7)
IMM GRANULOCYTES # BLD AUTO: 0 X10E3/UL (ref 0–0.1)
IMM GRANULOCYTES NFR BLD AUTO: 0 %
LDLC SERPL CALC-MCNC: 33 MG/DL (ref 0–99)
LYMPHOCYTES # BLD AUTO: 1 X10E3/UL (ref 0.7–3.1)
LYMPHOCYTES NFR BLD AUTO: 16 %
MCH RBC QN AUTO: 30.6 PG (ref 26.6–33)
MCHC RBC AUTO-ENTMCNC: 33.1 G/DL (ref 31.5–35.7)
MCV RBC AUTO: 93 FL (ref 79–97)
MONOCYTES # BLD AUTO: 0.7 X10E3/UL (ref 0.1–0.9)
MONOCYTES NFR BLD AUTO: 12 %
NEUTROPHILS # BLD AUTO: 4.5 X10E3/UL (ref 1.4–7)
NEUTROPHILS NFR BLD AUTO: 71 %
PLATELET # BLD AUTO: 220 X10E3/UL (ref 150–450)
POTASSIUM SERPL-SCNC: 4.4 MMOL/L (ref 3.5–5.2)
PROT SERPL-MCNC: 6.4 G/DL (ref 6–8.5)
RBC # BLD AUTO: 4.8 X10E6/UL (ref 4.14–5.8)
SODIUM SERPL-SCNC: 142 MMOL/L (ref 134–144)
TRIGL SERPL-MCNC: 51 MG/DL (ref 0–149)
VLDLC SERPL CALC-MCNC: 12 MG/DL (ref 5–40)
WBC # BLD AUTO: 6.4 X10E3/UL (ref 3.4–10.8)

## 2022-09-30 ENCOUNTER — OFFICE VISIT (OUTPATIENT)
Dept: INTERNAL MEDICINE | Facility: CLINIC | Age: 67
End: 2022-09-30

## 2022-09-30 VITALS
OXYGEN SATURATION: 98 % | HEIGHT: 71 IN | TEMPERATURE: 98.4 F | HEART RATE: 80 BPM | RESPIRATION RATE: 18 BRPM | BODY MASS INDEX: 25.34 KG/M2 | SYSTOLIC BLOOD PRESSURE: 144 MMHG | DIASTOLIC BLOOD PRESSURE: 82 MMHG | WEIGHT: 181 LBS

## 2022-09-30 DIAGNOSIS — E78.2 MIXED HYPERLIPIDEMIA: ICD-10-CM

## 2022-09-30 DIAGNOSIS — H61.23 BILATERAL IMPACTED CERUMEN: ICD-10-CM

## 2022-09-30 DIAGNOSIS — I10 ESSENTIAL HYPERTENSION: Primary | ICD-10-CM

## 2022-09-30 PROCEDURE — 99214 OFFICE O/P EST MOD 30 MIN: CPT | Performed by: INTERNAL MEDICINE

## 2022-09-30 PROCEDURE — 69210 REMOVE IMPACTED EAR WAX UNI: CPT | Performed by: INTERNAL MEDICINE

## 2022-09-30 RX ORDER — OLMESARTAN MEDOXOMIL 40 MG/1
40 TABLET ORAL DAILY
Qty: 90 TABLET | Refills: 1 | Status: SHIPPED | OUTPATIENT
Start: 2022-09-30 | End: 2022-11-29

## 2022-09-30 RX ORDER — AMOXICILLIN AND CLAVULANATE POTASSIUM 875; 125 MG/1; MG/1
1 TABLET, FILM COATED ORAL 2 TIMES DAILY
Qty: 10 TABLET | Refills: 0 | Status: SHIPPED | OUTPATIENT
Start: 2022-09-30 | End: 2022-10-05

## 2022-09-30 RX ORDER — AMLODIPINE BESYLATE 10 MG/1
10 TABLET ORAL DAILY
Qty: 90 TABLET | Refills: 1 | Status: SHIPPED | OUTPATIENT
Start: 2022-09-30

## 2022-09-30 NOTE — PROGRESS NOTES
"Chief Complaint  Hypertension and Allergies    Subjective        Leonid Pittman presents to [facility]  History of Present Illness  HLD, HTN, doing well, no chest pain, sob, headaches, vision changes, no lows; keeping home log, running more in 140s to 150s at home; notes having a lot more stress lately with work, life stressors    ALBIN, MDD, doing ok, following with psychiatry for meds, TMS, feels this has not been working as well lately; no thoughts of hurting self, others, suicide)    2 weeks of congestion, pressure, drainage        Objective   Vital Signs:  /82   Pulse 80   Temp 98.4 °F (36.9 °C)   Resp 18   Ht 180.3 cm (71\")   Wt 82.1 kg (181 lb)   SpO2 98%   BMI 25.24 kg/m²   Estimated body mass index is 25.24 kg/m² as calculated from the following:    Height as of this encounter: 180.3 cm (71\").    Weight as of this encounter: 82.1 kg (181 lb).          Physical Exam  Vitals and nursing note reviewed.   Constitutional:       General: He is not in acute distress.     Appearance: Normal appearance.   HENT:      Head: Normocephalic and atraumatic.      Right Ear: External ear normal.      Left Ear: External ear normal.      Nose: Nose normal.      Mouth/Throat:      Mouth: Mucous membranes are moist.      Pharynx: Oropharynx is clear.   Eyes:      Extraocular Movements: Extraocular movements intact.      Conjunctiva/sclera: Conjunctivae normal.      Pupils: Pupils are equal, round, and reactive to light.   Cardiovascular:      Rate and Rhythm: Normal rate and regular rhythm.      Pulses: Normal pulses.      Heart sounds: Normal heart sounds. No murmur heard.    No gallop.   Pulmonary:      Effort: Pulmonary effort is normal.      Breath sounds: Normal breath sounds.   Abdominal:      General: Abdomen is flat. Bowel sounds are normal. There is no distension.      Palpations: Abdomen is soft. There is no mass.      Tenderness: There is no abdominal " tenderness.   Musculoskeletal:         General: No swelling. Normal range of motion.      Cervical back: Normal range of motion and neck supple.   Skin:     General: Skin is warm and dry.      Findings: No rash.   Neurological:      General: No focal deficit present.      Mental Status: He is alert and oriented to person, place, and time. Mental status is at baseline.   Psychiatric:         Mood and Affect: Mood normal.         Behavior: Behavior normal.        Result Review :         Ear Cerumen Removal    Date/Time: 9/30/2022 9:13 AM  Performed by: Tulio Carbone MD  Authorized by: Tulio Carbone MD     Anesthesia:  Local Anesthetic: none  Location details: right ear and left ear  Procedure type: instrumentation, curette   Sedation:  Patient sedated: no              Assessment and Plan   Diagnoses and all orders for this visit:    1. Essential hypertension (Primary)  -     Comprehensive Metabolic Panel  -     Lipid Panel With / Chol / HDL Ratio  -     Hemoglobin A1c    2. Mixed hyperlipidemia  -     Comprehensive Metabolic Panel  -     Lipid Panel With / Chol / HDL Ratio  -     Hemoglobin A1c    3. Bilateral impacted cerumen  -     Cerumen Removal    Other orders  -     olmesartan (BENICAR) 40 MG tablet; Take 1 tablet by mouth Daily.  Dispense: 90 tablet; Refill: 1  -     amLODIPine (NORVASC) 10 MG tablet; Take 1 tablet by mouth Daily.  Dispense: 90 tablet; Refill: 1  -     amoxicillin-clavulanate (Augmentin) 875-125 MG per tablet; Take 1 tablet by mouth 2 (Two) Times a Day for 5 days.  Dispense: 10 tablet; Refill: 0    - htn, not at goal, increase amlodipine to 10mg and olmesartan to 40mg  - hld, check labs  - sinusitis, start augmentin; add flonase, zyrtec  - counseled on mood, anxiety, consider ssri type meds, he will follow up with psych  - discussed risks/benefits/alternatives of meds/treatments  - rtc 2-4 weeks         Follow Up   No follow-ups on file.  Patient was given instructions and  counseling regarding his condition or for health maintenance advice. Please see specific information pulled into the AVS if appropriate.

## 2022-10-01 LAB
ALBUMIN SERPL-MCNC: 4.7 G/DL (ref 3.5–5.2)
ALBUMIN/GLOB SERPL: 3.1 G/DL
ALP SERPL-CCNC: 78 U/L (ref 39–117)
ALT SERPL-CCNC: 25 U/L (ref 1–41)
AST SERPL-CCNC: 26 U/L (ref 1–40)
BILIRUB SERPL-MCNC: 0.9 MG/DL (ref 0–1.2)
BUN SERPL-MCNC: 16 MG/DL (ref 8–23)
BUN/CREAT SERPL: 17.6 (ref 7–25)
CALCIUM SERPL-MCNC: 9.7 MG/DL (ref 8.6–10.5)
CHLORIDE SERPL-SCNC: 100 MMOL/L (ref 98–107)
CHOLEST SERPL-MCNC: 123 MG/DL (ref 0–200)
CHOLEST/HDLC SERPL: 1.76 {RATIO}
CO2 SERPL-SCNC: 32.7 MMOL/L (ref 22–29)
CREAT SERPL-MCNC: 0.91 MG/DL (ref 0.76–1.27)
EGFRCR SERPLBLD CKD-EPI 2021: 94.1 ML/MIN/1.73
GLOBULIN SER CALC-MCNC: 1.5 GM/DL
GLUCOSE SERPL-MCNC: 103 MG/DL (ref 65–99)
HBA1C MFR BLD: 5.2 % (ref 4.8–5.6)
HDLC SERPL-MCNC: 70 MG/DL (ref 40–60)
LDLC SERPL CALC-MCNC: 41 MG/DL (ref 0–100)
POTASSIUM SERPL-SCNC: 4.7 MMOL/L (ref 3.5–5.2)
PROT SERPL-MCNC: 6.2 G/DL (ref 6–8.5)
SODIUM SERPL-SCNC: 139 MMOL/L (ref 136–145)
TRIGL SERPL-MCNC: 52 MG/DL (ref 0–150)
VLDLC SERPL CALC-MCNC: 12 MG/DL (ref 5–40)

## 2022-10-05 ENCOUNTER — OFFICE VISIT (OUTPATIENT)
Dept: INTERNAL MEDICINE | Facility: CLINIC | Age: 67
End: 2022-10-05

## 2022-10-05 VITALS
HEIGHT: 71 IN | RESPIRATION RATE: 16 BRPM | OXYGEN SATURATION: 97 % | HEART RATE: 78 BPM | SYSTOLIC BLOOD PRESSURE: 128 MMHG | BODY MASS INDEX: 25.81 KG/M2 | WEIGHT: 184.4 LBS | DIASTOLIC BLOOD PRESSURE: 70 MMHG | TEMPERATURE: 98.4 F

## 2022-10-05 DIAGNOSIS — J32.9 SINUSITIS, BACTERIAL: ICD-10-CM

## 2022-10-05 DIAGNOSIS — M54.50 LOW BACK PAIN, UNSPECIFIED BACK PAIN LATERALITY, UNSPECIFIED CHRONICITY, UNSPECIFIED WHETHER SCIATICA PRESENT: Primary | ICD-10-CM

## 2022-10-05 DIAGNOSIS — B96.89 SINUSITIS, BACTERIAL: ICD-10-CM

## 2022-10-05 PROCEDURE — 99214 OFFICE O/P EST MOD 30 MIN: CPT | Performed by: INTERNAL MEDICINE

## 2022-10-05 RX ORDER — METAXALONE 800 MG/1
800 TABLET ORAL 3 TIMES DAILY PRN
Qty: 90 TABLET | Refills: 2 | Status: SHIPPED | OUTPATIENT
Start: 2022-10-05 | End: 2022-11-29

## 2022-10-05 RX ORDER — MELOXICAM 15 MG/1
15 TABLET ORAL DAILY PRN
Qty: 30 TABLET | Refills: 2 | Status: SHIPPED | OUTPATIENT
Start: 2022-10-05 | End: 2022-11-29

## 2022-10-05 RX ORDER — LEVOFLOXACIN 500 MG/1
500 TABLET, FILM COATED ORAL DAILY
Qty: 7 TABLET | Refills: 0 | Status: SHIPPED | OUTPATIENT
Start: 2022-10-05 | End: 2022-10-12

## 2022-10-05 NOTE — PROGRESS NOTES
"Chief Complaint  Nasal Congestion (X 3 weeks ), Fatigue (X 3 weeks ), Cough (X 3 weeks /), and Pain (Lumbar )    Subjective        Leonid Pittman presents to [facility]  History of Present Illness  Here with 3 weeks of continued nasal congestion/pressure, post nasal drip, cough; no fevers, no tooth pain/jaw pain; has had diarrhea for last 1 week or so since starting the augmentin, 3-4 times per day, watery at times    Also having right sided low back pain, muscle spasm, non radiating, no weakness      Objective   Vital Signs:  /70   Pulse 78   Temp 98.4 °F (36.9 °C)   Resp 16   Ht 180.3 cm (71\")   Wt 83.6 kg (184 lb 6.4 oz)   SpO2 97%   BMI 25.72 kg/m²   Estimated body mass index is 25.72 kg/m² as calculated from the following:    Height as of this encounter: 180.3 cm (71\").    Weight as of this encounter: 83.6 kg (184 lb 6.4 oz).          Physical Exam  Vitals and nursing note reviewed.   Constitutional:       General: He is not in acute distress.     Appearance: Normal appearance.   HENT:      Head: Normocephalic and atraumatic.      Right Ear: External ear normal.      Left Ear: External ear normal.      Nose: Nose normal.      Mouth/Throat:      Mouth: Mucous membranes are moist.      Pharynx: Oropharynx is clear.   Eyes:      Extraocular Movements: Extraocular movements intact.      Conjunctiva/sclera: Conjunctivae normal.      Pupils: Pupils are equal, round, and reactive to light.   Cardiovascular:      Rate and Rhythm: Normal rate and regular rhythm.      Pulses: Normal pulses.      Heart sounds: Normal heart sounds. No murmur heard.    No gallop.   Pulmonary:      Effort: Pulmonary effort is normal.      Breath sounds: Normal breath sounds.   Abdominal:      General: Abdomen is flat. Bowel sounds are normal. There is no distension.      Palpations: Abdomen is soft. There is no mass.      Tenderness: There is no abdominal tenderness. "   Musculoskeletal:         General: No swelling. Normal range of motion.      Cervical back: Normal range of motion and neck supple.   Skin:     General: Skin is warm and dry.      Findings: No rash.   Neurological:      General: No focal deficit present.      Mental Status: He is alert and oriented to person, place, and time. Mental status is at baseline.   Psychiatric:         Mood and Affect: Mood normal.         Behavior: Behavior normal.        Result Review :                Assessment and Plan   Diagnoses and all orders for this visit:    1. Low back pain, unspecified back pain laterality, unspecified chronicity, unspecified whether sciatica present (Primary)    2. Sinusitis, bacterial    Other orders  -     metaxalone (Skelaxin) 800 MG tablet; Take 1 tablet by mouth 3 (Three) Times a Day As Needed for Muscle Spasms.  Dispense: 90 tablet; Refill: 2  -     meloxicam (MOBIC) 15 MG tablet; Take 1 tablet by mouth Daily As Needed for Mild Pain.  Dispense: 30 tablet; Refill: 2  -     levoFLOXacin (Levaquin) 500 MG tablet; Take 1 tablet by mouth Daily for 7 days.  Dispense: 7 tablet; Refill: 0    - meds as above, levoflox for sinusitis, metaxalone and meloxicam for low back strain/spasm; consider pt evaluation; consider imaging  - rtc to follow up pending above         Follow Up   No follow-ups on file.  Patient was given instructions and counseling regarding his condition or for health maintenance advice. Please see specific information pulled into the AVS if appropriate.

## 2022-10-14 ENCOUNTER — TELEPHONE (OUTPATIENT)
Dept: INTERNAL MEDICINE | Facility: CLINIC | Age: 67
End: 2022-10-14

## 2022-10-14 DIAGNOSIS — R05.1 ACUTE COUGH: ICD-10-CM

## 2022-10-14 DIAGNOSIS — J32.9 SINUSITIS, BACTERIAL: Primary | ICD-10-CM

## 2022-10-14 DIAGNOSIS — B96.89 SINUSITIS, BACTERIAL: Primary | ICD-10-CM

## 2022-10-14 NOTE — TELEPHONE ENCOUNTER
Caller: Leonid Pittman    Relationship to patient: Self    Best call back number: [phone]    Where are you experiencing symptoms: FATIGUE, COUGH, NAUSEA, CONGESTION    How long have you been experiencing symptoms: 2 WEEKS    What therapies/medications have you tried: 2 ROUNDS OF ANTIBIOTICS, WAS GETTING BETTER BUT AS SOON AS LAST PILL WAS TAKEN SYMPTOMS RETURNED    PATIENT WOULD LIKE A CALL TO DISCUSS WHAT NEXT STEPS ARE. PLEASE REFERENCE 10/13/22 ROSEMARY  If a prescription is needed, what is your preferred pharmacy:   [id number] - [place], [place] - [address] [place] [address] - [phone]  - [phone] [address] [address]  Phone: [phone] Fax: [phone]

## 2022-10-14 NOTE — TELEPHONE ENCOUNTER
Spoke with pt; asked to follow up to discuss further offered to make an appt for pt, but states will play by ear and see how it goes and call Monday if not feeling better

## 2022-10-17 NOTE — TELEPHONE ENCOUNTER
PATIENT CALLED AND STATED THAT HE IS FEELING WORSE. PATIENT WOULD LIKE SOME OTHER KIND OF MEDICATION TO HELP HIM FEEL BETTER. PATIENT HAS BEEN DEALING WITH THIS ISSUE FOR ABOUT 2 WEEKS NOW. PLEASE CALL AND ADVISE PATIENT WHAT TO DO NEXT.

## 2022-11-29 ENCOUNTER — OFFICE VISIT (OUTPATIENT)
Dept: INTERNAL MEDICINE | Facility: CLINIC | Age: 67
End: 2022-11-29

## 2022-11-29 VITALS
TEMPERATURE: 98.7 F | HEART RATE: 71 BPM | HEIGHT: 71 IN | BODY MASS INDEX: 25.2 KG/M2 | WEIGHT: 180 LBS | DIASTOLIC BLOOD PRESSURE: 64 MMHG | OXYGEN SATURATION: 98 % | RESPIRATION RATE: 18 BRPM | SYSTOLIC BLOOD PRESSURE: 118 MMHG

## 2022-11-29 DIAGNOSIS — Z00.00 WELL ADULT EXAM: Primary | ICD-10-CM

## 2022-11-29 DIAGNOSIS — I10 ESSENTIAL HYPERTENSION: ICD-10-CM

## 2022-11-29 LAB
BILIRUB BLD-MCNC: NEGATIVE MG/DL
CLARITY, POC: CLEAR
COLOR UR: YELLOW
EXPIRATION DATE: NORMAL
GLUCOSE UR STRIP-MCNC: NEGATIVE MG/DL
KETONES UR QL: NEGATIVE
LEUKOCYTE EST, POC: NEGATIVE
Lab: NORMAL
NITRITE UR-MCNC: NEGATIVE MG/ML
PH UR: 6 [PH] (ref 5–8)
PROT UR STRIP-MCNC: NEGATIVE MG/DL
RBC # UR STRIP: NEGATIVE /UL
SP GR UR: 1.01 (ref 1–1.03)
UROBILINOGEN UR QL: NORMAL

## 2022-11-29 PROCEDURE — G0402 INITIAL PREVENTIVE EXAM: HCPCS | Performed by: INTERNAL MEDICINE

## 2022-11-29 PROCEDURE — 1159F MED LIST DOCD IN RCRD: CPT | Performed by: INTERNAL MEDICINE

## 2022-11-29 PROCEDURE — 81003 URINALYSIS AUTO W/O SCOPE: CPT | Performed by: INTERNAL MEDICINE

## 2022-11-29 PROCEDURE — G0403 EKG FOR INITIAL PREVENT EXAM: HCPCS | Performed by: INTERNAL MEDICINE

## 2022-11-29 PROCEDURE — 90677 PCV20 VACCINE IM: CPT | Performed by: INTERNAL MEDICINE

## 2022-11-29 PROCEDURE — 1170F FXNL STATUS ASSESSED: CPT | Performed by: INTERNAL MEDICINE

## 2022-11-29 PROCEDURE — G0009 ADMIN PNEUMOCOCCAL VACCINE: HCPCS | Performed by: INTERNAL MEDICINE

## 2022-11-29 RX ORDER — TADALAFIL 5 MG/1
5 TABLET ORAL DAILY PRN
Qty: 30 TABLET | Refills: 6 | Status: SHIPPED | OUTPATIENT
Start: 2022-11-29

## 2022-11-29 NOTE — PROGRESS NOTES
"Chief Complaint  Annual Exam    Subjective        Leonid Pittman presents to [facility]  History of Present Illness  Here with some cough still, feels in chest area; took doxy and oral steroids recently with ENT, symptoms have improved; still with daily morning cough symptoms, seems productive at times; does not coincide with his use of intermittent of BB for hand tremor, predates this; feels overall much better    Recent hemorrhoid, taking mesalamine      Objective   Vital Signs:  /64   Pulse 71   Temp 98.7 °F (37.1 °C)   Resp 18   Ht 180.3 cm (71\")   Wt 81.6 kg (180 lb)   SpO2 98%   BMI 25.10 kg/m²   Estimated body mass index is 25.1 kg/m² as calculated from the following:    Height as of this encounter: 180.3 cm (71\").    Weight as of this encounter: 81.6 kg (180 lb).          Physical Exam  Vitals and nursing note reviewed.   Constitutional:       General: He is not in acute distress.     Appearance: Normal appearance.   HENT:      Head: Normocephalic and atraumatic.      Right Ear: External ear normal.      Left Ear: External ear normal.      Nose: Nose normal.      Mouth/Throat:      Mouth: Mucous membranes are moist.      Pharynx: Oropharynx is clear.   Eyes:      Extraocular Movements: Extraocular movements intact.      Conjunctiva/sclera: Conjunctivae normal.      Pupils: Pupils are equal, round, and reactive to light.   Cardiovascular:      Rate and Rhythm: Normal rate and regular rhythm.      Pulses: Normal pulses.      Heart sounds: Normal heart sounds. No murmur heard.    No gallop.   Pulmonary:      Effort: Pulmonary effort is normal.      Breath sounds: Normal breath sounds.   Abdominal:      General: Abdomen is flat. Bowel sounds are normal. There is no distension.      Palpations: Abdomen is soft. There is no mass.      Tenderness: There is no abdominal tenderness.   Musculoskeletal:         General: No swelling. Normal range of motion.    "   Cervical back: Normal range of motion and neck supple.   Skin:     General: Skin is warm and dry.      Findings: No rash.   Neurological:      General: No focal deficit present.      Mental Status: He is alert and oriented to person, place, and time. Mental status is at baseline.   Psychiatric:         Mood and Affect: Mood normal.         Behavior: Behavior normal.        Result Review :{Labs  Result Review  Imaging  Med Tab  Media  Procedures  :23}           ECG 12 Lead    Date/Time: 11/29/2022 9:11 PM  Performed by: Tulio Carbone MD  Authorized by: Tulio Carbone MD   Comparison: not compared with previous ECG   Previous ECG: no previous ECG available  Rhythm: sinus rhythm  Rate: normal  Conduction: conduction normal  ST Segments: ST segments normal  T Waves: T waves normal  QRS axis: normal    Clinical impression: normal ECG              Assessment and Plan   There are no diagnoses linked to this encounter.         Follow Up   No follow-ups on file.  Patient was given instructions and counseling regarding his condition or for health maintenance advice. Please see specific information pulled into the AVS if appropriate.

## 2022-11-30 LAB
ALBUMIN SERPL-MCNC: 4.4 G/DL (ref 3.5–5.2)
ALBUMIN/GLOB SERPL: 2.2 G/DL
ALP SERPL-CCNC: 73 U/L (ref 39–117)
ALT SERPL-CCNC: 29 U/L (ref 1–41)
AST SERPL-CCNC: 31 U/L (ref 1–40)
BASOPHILS # BLD AUTO: 0.02 10*3/MM3 (ref 0–0.2)
BASOPHILS NFR BLD AUTO: 0.4 % (ref 0–1.5)
BILIRUB SERPL-MCNC: 0.7 MG/DL (ref 0–1.2)
BUN SERPL-MCNC: 15 MG/DL (ref 8–23)
BUN/CREAT SERPL: 17.9 (ref 7–25)
CALCIUM SERPL-MCNC: 9.8 MG/DL (ref 8.6–10.5)
CHLORIDE SERPL-SCNC: 103 MMOL/L (ref 98–107)
CHOLEST SERPL-MCNC: 132 MG/DL (ref 0–200)
CO2 SERPL-SCNC: 26.1 MMOL/L (ref 22–29)
CREAT SERPL-MCNC: 0.84 MG/DL (ref 0.76–1.27)
EGFRCR SERPLBLD CKD-EPI 2021: 96.8 ML/MIN/1.73
EOSINOPHIL # BLD AUTO: 0.05 10*3/MM3 (ref 0–0.4)
EOSINOPHIL NFR BLD AUTO: 1 % (ref 0.3–6.2)
ERYTHROCYTE [DISTWIDTH] IN BLOOD BY AUTOMATED COUNT: 12.8 % (ref 12.3–15.4)
GLOBULIN SER CALC-MCNC: 2 GM/DL
GLUCOSE SERPL-MCNC: 92 MG/DL (ref 65–99)
HBA1C MFR BLD: 5.2 % (ref 4.8–5.6)
HCT VFR BLD AUTO: 44.2 % (ref 37.5–51)
HDLC SERPL-MCNC: 84 MG/DL (ref 40–60)
HGB BLD-MCNC: 14.8 G/DL (ref 13–17.7)
IMM GRANULOCYTES # BLD AUTO: 0.02 10*3/MM3 (ref 0–0.05)
IMM GRANULOCYTES NFR BLD AUTO: 0.4 % (ref 0–0.5)
LDLC SERPL CALC-MCNC: 37 MG/DL (ref 0–100)
LYMPHOCYTES # BLD AUTO: 0.78 10*3/MM3 (ref 0.7–3.1)
LYMPHOCYTES NFR BLD AUTO: 16.1 % (ref 19.6–45.3)
MCH RBC QN AUTO: 31.2 PG (ref 26.6–33)
MCHC RBC AUTO-ENTMCNC: 33.5 G/DL (ref 31.5–35.7)
MCV RBC AUTO: 93.1 FL (ref 79–97)
MONOCYTES # BLD AUTO: 0.64 10*3/MM3 (ref 0.1–0.9)
MONOCYTES NFR BLD AUTO: 13.2 % (ref 5–12)
NEUTROPHILS # BLD AUTO: 3.34 10*3/MM3 (ref 1.7–7)
NEUTROPHILS NFR BLD AUTO: 68.9 % (ref 42.7–76)
NRBC BLD AUTO-RTO: 0 /100 WBC (ref 0–0.2)
PLATELET # BLD AUTO: 210 10*3/MM3 (ref 140–450)
POTASSIUM SERPL-SCNC: 4.9 MMOL/L (ref 3.5–5.2)
PROT SERPL-MCNC: 6.4 G/DL (ref 6–8.5)
RBC # BLD AUTO: 4.75 10*6/MM3 (ref 4.14–5.8)
SODIUM SERPL-SCNC: 140 MMOL/L (ref 136–145)
TRIGL SERPL-MCNC: 45 MG/DL (ref 0–150)
VLDLC SERPL CALC-MCNC: 11 MG/DL (ref 5–40)
WBC # BLD AUTO: 4.85 10*3/MM3 (ref 3.4–10.8)

## 2022-11-30 NOTE — PROGRESS NOTES
The ABCs of the Annual Wellness Visit  Subsequent Medicare Wellness Visit    Chief Complaint   Patient presents with   • Annual Exam      Subjective    History of Present Illness:  Leonid Pittman is a 67 y.o. male who presents for a Subsequent Medicare Wellness Visit.    The following portions of the patient's history were reviewed and   updated as appropriate: allergies, current medications, past family history, past medical history, past social history, past surgical history and problem list.    Compared to one year ago, the patient feels his physical   health is the same.    Compared to one year ago, the patient feels his mental   health is the same.    Recent Hospitalizations:  He was not admitted to the hospital during the last year.       Current Medical Providers:  Patient Care Team:  Tulio Carbone MD as PCP - General (Internal Medicine)    Outpatient Medications Prior to Visit   Medication Sig Dispense Refill   • ALPRAZolam (XANAX) 0.25 MG tablet TK 1 T PO BID PRN     • amLODIPine (NORVASC) 10 MG tablet Take 1 tablet by mouth Daily. 90 tablet 1   • aspirin 81 MG tablet Take 81 mg by mouth.     • atorvastatin (LIPITOR) 40 MG tablet Take 1 tablet by mouth Daily. 90 tablet 3   • fluticasone (FLONASE) 50 MCG/ACT nasal spray      • levocetirizine (XYZAL) 5 MG tablet      • Multiple Vitamin (MULTIVITAMIN) tablet Take 1 tablet by mouth Daily.     • propranolol (INDERAL) 10 MG tablet Take 1 tablet by mouth 2 (Two) Times a Day As Needed (tremor). 180 tablet 2   • zolpidem (AMBIEN) 10 MG tablet Take 10 mg by mouth At Night As Needed.     • tadalafil (CIALIS) 5 MG tablet Take 1 tablet by mouth Daily As Needed for Erectile Dysfunction. 30 tablet 3   • meloxicam (MOBIC) 15 MG tablet Take 1 tablet by mouth Daily As Needed for Mild Pain. 30 tablet 2   • metaxalone (Skelaxin) 800 MG tablet Take 1 tablet by mouth 3 (Three) Times a Day As Needed for Muscle Spasms. 90 tablet 2   • olmesartan (BENICAR) 40 MG tablet Take 1  "tablet by mouth Daily. 90 tablet 1     No facility-administered medications prior to visit.       No opioid medication identified on active medication list. I have reviewed chart for other potential  high risk medication/s and harmful drug interactions in the elderly.          Aspirin is on active medication list. Aspirin use is not indicated based on review of current medical condition/s. Risk of harm outweighs potential benefits. Patient instructed to discontinue this medication.  .      There is no problem list on file for this patient.    Advance Care Planning  Advance Directive is not on file.  ACP discussion was held with the patient during this visit. Patient does not have an advance directive, information provided.          Objective    Vitals:    11/29/22 0943   BP: 118/64   Pulse: 71   Resp: 18   Temp: 98.7 °F (37.1 °C)   SpO2: 98%   Weight: 81.6 kg (180 lb)   Height: 180.3 cm (71\")     Estimated body mass index is 25.1 kg/m² as calculated from the following:    Height as of this encounter: 180.3 cm (71\").    Weight as of this encounter: 81.6 kg (180 lb).    BMI is >= 25 and <30. (Overweight) The following options were offered after discussion;: exercise counseling/recommendations and nutrition counseling/recommendations      Does the patient have evidence of cognitive impairment? No    Physical Exam  Lab Results   Component Value Date    CHLPL 123 09/30/2022    TRIG 52 09/30/2022    HDL 70 (H) 09/30/2022    LDL 41 09/30/2022    VLDL 12 09/30/2022    HGBA1C 5.20 09/30/2022            HEALTH RISK ASSESSMENT    Smoking Status:  Social History     Tobacco Use   Smoking Status Never   Smokeless Tobacco Never     Alcohol Consumption:  Social History     Substance and Sexual Activity   Alcohol Use Yes   • Alcohol/week: 4.0 standard drinks   • Types: 4 Shots of liquor per week     Fall Risk Screen:    LUCAS Fall Risk Assessment was completed, and patient is at LOW risk for falls.Assessment completed " on:11/29/2022    Depression Screening:  PHQ-2/PHQ-9 Depression Screening 11/29/2022   Retired PHQ-9 Total Score -   Retired Total Score -   Little Interest or Pleasure in Doing Things 0-->not at all   Feeling Down, Depressed or Hopeless 0-->not at all   Trouble Falling or Staying Asleep, or Sleeping Too Much 0-->not at all   Feeling Tired or Having Little Energy 0-->not at all   Poor Appetite or Overeating 0-->not at all   Feeling Bad about Yourself - or that You are a Failure or Have Let Yourself or Your Family Down 0-->not at all   Trouble Concentrating on Things, Such as Reading the Newspaper or Watching Television 0-->not at all   Moving or Speaking So Slowly that Other People Could Have Noticed? Or the Opposite - Being So Fidgety 0-->not at all   Thoughts that You Would be Better Off Dead or of Hurting Yourself in Some Way 0-->not at all   PHQ-9: Brief Depression Severity Measure Score 0   If You Checked Off Any Problems, How Difficult Have These Problems Made It For You to Do Your Work, Take Care of Things at Home, or Get Along with Other People? not difficult at all       Health Habits and Functional and Cognitive Screening:  Functional & Cognitive Status 11/29/2022   Do you have difficulty preparing food and eating? No   Do you have difficulty bathing yourself, getting dressed or grooming yourself? No   Do you have difficulty using the toilet? No   Do you have difficulty moving around from place to place? No   Do you have trouble with steps or getting out of a bed or a chair? No   Current Diet Well Balanced Diet   Dental Exam Up to date   Eye Exam Up to date   Exercise (times per week) 3 times per week   Current Exercises Include Walking   Do you need help using the phone?  No   Are you deaf or do you have serious difficulty hearing?  No   Do you need help with transportation? No   Do you need help shopping? No   Do you need help preparing meals?  No   Do you need help with housework?  No   Do you need help  with laundry? No   Do you need help taking your medications? No   Do you need help managing money? No   Do you ever drive or ride in a car without wearing a seat belt? No   Have you felt unusual stress, anger or loneliness in the last month? No   Who do you live with? Alone   If you need help, do you have trouble finding someone available to you? No   Have you been bothered in the last four weeks by sexual problems? No   Do you have difficulty concentrating, remembering or making decisions? No       Age-appropriate Screening Schedule:  Refer to the list below for future screening recommendations based on patient's age, sex and/or medical conditions. Orders for these recommended tests are listed in the plan section. The patient has been provided with a written plan.    Health Maintenance   Topic Date Due   • LIPID PANEL  09/30/2023   • TDAP/TD VACCINES (3 - Td or Tdap) 06/12/2031   • INFLUENZA VACCINE  Completed   • ZOSTER VACCINE  Completed              Assessment & Plan   CMS Preventative Services Quick Reference  Risk Factors Identified During Encounter  None Identified  The above risks/problems have been discussed with the patient.  Follow up actions/plans if indicated are seen below in the Assessment/Plan Section.  Pertinent information has been shared with the patient in the After Visit Summary.    Diagnoses and all orders for this visit:    1. Well adult exam (Primary)  -     Comprehensive Metabolic Panel  -     Lipid Panel  -     Hemoglobin A1c  -     CBC & Differential    2. Essential hypertension  -     ECG 12 Lead  -     POCT urinalysis dipstick, automated    Other orders  -     tadalafil (CIALIS) 5 MG tablet; Take 1 tablet by mouth Daily As Needed for Erectile Dysfunction.  Dispense: 30 tablet; Refill: 6  -     Pneumococcal Conjugate Vaccine 20-Valent (PCV20)        Follow Up:   No follow-ups on file.     An After Visit Summary and PPPS were made available to the patient.

## 2022-11-30 NOTE — PROGRESS NOTES
"Chief Complaint   Patient presents with   • Annual Exam       Subjective   Leonid Pittman is a 67 y.o. male.     History of Present Illness     The following portions of the patient's history were reviewed and updated as appropriate: allergies, current medications, past family history, past medical history, past social history, past surgical history, and problem list.    Review of Systems      Objective   Body mass index is 25.1 kg/m².   Vitals:    11/29/22 0943   BP: 118/64   Pulse: 71   Resp: 18   Temp: 98.7 °F (37.1 °C)   SpO2: 98%   Weight: 81.6 kg (180 lb)   Height: 180.3 cm (71\")        Physical Exam  Vitals and nursing note reviewed.   Constitutional:       General: He is not in acute distress.     Appearance: Normal appearance.   HENT:      Head: Normocephalic and atraumatic.      Right Ear: External ear normal.      Left Ear: External ear normal.      Nose: Nose normal.      Mouth/Throat:      Mouth: Mucous membranes are moist.      Pharynx: Oropharynx is clear.   Eyes:      Extraocular Movements: Extraocular movements intact.      Conjunctiva/sclera: Conjunctivae normal.      Pupils: Pupils are equal, round, and reactive to light.   Cardiovascular:      Rate and Rhythm: Normal rate and regular rhythm.      Pulses: Normal pulses.      Heart sounds: Normal heart sounds. No murmur heard.    No gallop.   Pulmonary:      Effort: Pulmonary effort is normal.      Breath sounds: Normal breath sounds.   Abdominal:      General: Abdomen is flat. Bowel sounds are normal. There is no distension.      Palpations: Abdomen is soft. There is no mass.      Tenderness: There is no abdominal tenderness.   Musculoskeletal:         General: No swelling. Normal range of motion.      Cervical back: Normal range of motion and neck supple.   Skin:     General: Skin is warm and dry.      Findings: No rash.   Neurological:      General: No focal deficit present.      Mental Status: He is alert and oriented to person, place, and time. " Mental status is at baseline.   Psychiatric:         Mood and Affect: Mood normal.         Behavior: Behavior normal.           Current Outpatient Medications:   •  ALPRAZolam (XANAX) 0.25 MG tablet, TK 1 T PO BID PRN, Disp: , Rfl:   •  amLODIPine (NORVASC) 10 MG tablet, Take 1 tablet by mouth Daily., Disp: 90 tablet, Rfl: 1  •  aspirin 81 MG tablet, Take 81 mg by mouth., Disp: , Rfl:   •  atorvastatin (LIPITOR) 40 MG tablet, Take 1 tablet by mouth Daily., Disp: 90 tablet, Rfl: 3  •  fluticasone (FLONASE) 50 MCG/ACT nasal spray, , Disp: , Rfl:   •  levocetirizine (XYZAL) 5 MG tablet, , Disp: , Rfl:   •  Multiple Vitamin (MULTIVITAMIN) tablet, Take 1 tablet by mouth Daily., Disp: , Rfl:   •  propranolol (INDERAL) 10 MG tablet, Take 1 tablet by mouth 2 (Two) Times a Day As Needed (tremor)., Disp: 180 tablet, Rfl: 2  •  tadalafil (CIALIS) 5 MG tablet, Take 1 tablet by mouth Daily As Needed for Erectile Dysfunction., Disp: 30 tablet, Rfl: 6  •  zolpidem (AMBIEN) 10 MG tablet, Take 10 mg by mouth At Night As Needed., Disp: , Rfl:      Lab Results   Component Value Date    HGBA1C 5.20 09/30/2022    TSH 1.250 07/28/2021    ZHMB73MQ 74.1 11/09/2019    PSA 0.01 01/27/2021        Health Maintenance   Topic Date Due   • LIPID PANEL  09/30/2023   • TDAP/TD VACCINES (3 - Td or Tdap) 06/12/2031   • INFLUENZA VACCINE  Completed   • ZOSTER VACCINE  Completed        Immunization History   Administered Date(s) Administered   • COVID-19 (PFIZER) BIVALENT BOOSTER 12+YRS 09/07/2022   • COVID-19 (PFIZER) PURPLE CAP 01/08/2021, 01/29/2021, 09/27/2021   • Covid-19 (Pfizer) Gray Cap 04/08/2022   • Pneumococcal Conjugate 20-Valent (PCV20) 11/29/2022       Assessment & Plan   Diagnoses and all orders for this visit:    1. Well adult exam (Primary)  -     Comprehensive Metabolic Panel  -     Lipid Panel  -     Hemoglobin A1c  -     CBC & Differential    2. Essential hypertension  -     ECG 12 Lead  -     POCT urinalysis dipstick,  automated    Other orders  -     tadalafil (CIALIS) 5 MG tablet; Take 1 tablet by mouth Daily As Needed for Erectile Dysfunction.  Dispense: 30 tablet; Refill: 6  -     Pneumococcal Conjugate Vaccine 20-Valent (PCV20)           Well adult exam  - labs checked and evaluated    Colonoscopy - up to date  Prostate - screening, counseled  Glaucoma - yearly  AAA - na  Lung cancer - na  HIV - neg  HCV - neg  DM - checking  HLD - checking  Smoking - no  Depression - yes well managed  Vaccines - counseled  Falls - no  Alcohol Screening - no    Discussed mental health, sexual health, substance use, abuse, anticipatory guidance given.      No follow-ups on file.     Tulio aCrbone MD  [facility] [place]  Internal Medicine and Pediatrics  Phone: [phone]  Fax: [phone]

## 2022-12-03 LAB
Lab: NORMAL
PSA SERPL-MCNC: <0.014 NG/ML (ref 0–4)
WRITTEN AUTHORIZATION: NORMAL

## 2023-04-20 ENCOUNTER — TELEPHONE (OUTPATIENT)
Dept: INTERNAL MEDICINE | Facility: CLINIC | Age: 68
End: 2023-04-20

## 2023-04-20 NOTE — TELEPHONE ENCOUNTER
"  Caller: Leonid Pittman \"JOHANA\"    Relationship: Self    Best call back number: [phone]    What was the call regarding: PATIENT WOULD LIKE TO KNOW IF THE OFFICE HAS THE NEW COVID BOOSTER VACCINE AVAILABLE YET. PLEASE CALL TO ADVISE AND SCHEDULE IF SO.    Do you require a callback: YES  "

## 2023-04-20 NOTE — TELEPHONE ENCOUNTER
Spoke with patient   Informed patient we do not currently have the vaccines in office but are working on receiving them .

## 2023-05-12 RX ORDER — AMLODIPINE BESYLATE 10 MG/1
10 TABLET ORAL DAILY
Qty: 90 TABLET | Refills: 1 | Status: SHIPPED | OUTPATIENT
Start: 2023-05-12

## 2023-05-17 ENCOUNTER — TELEPHONE (OUTPATIENT)
Dept: INTERNAL MEDICINE | Facility: CLINIC | Age: 68
End: 2023-05-17

## 2023-05-17 NOTE — TELEPHONE ENCOUNTER
"  Caller: Leonid Pittman \"JOHANA\"    Relationship: Self    Best call back number: [phone]    What is the best time to reach you: ANYTIME     Who are you requesting to speak with (clinical staff, provider,  specific staff member): CLINICAL STAFF     What was the call regarding: PATIENT IS REQUESTING A CALL BACK TO DISCUSS HIS MEDICATIONS.     PATIENT DID NOT WISH TO PROVIDE ANY FURTHER INFORMATION .     Do you require a callback: YES   "

## 2023-08-23 ENCOUNTER — TELEPHONE (OUTPATIENT)
Dept: INTERNAL MEDICINE | Facility: CLINIC | Age: 68
End: 2023-08-23

## 2023-08-23 RX ORDER — AMOXICILLIN 500 MG/1
1000 CAPSULE ORAL 3 TIMES DAILY
Qty: 30 CAPSULE | Refills: 0 | Status: SHIPPED | OUTPATIENT
Start: 2023-08-23 | End: 2023-08-28

## 2023-08-23 RX ORDER — AZITHROMYCIN 250 MG/1
TABLET, FILM COATED ORAL
Qty: 6 TABLET | Refills: 0 | Status: SHIPPED | OUTPATIENT
Start: 2023-08-23

## 2023-08-23 NOTE — TELEPHONE ENCOUNTER
I l/m on patient's vm asking him to call and schedule a MyChart appointment with you for tomorrow.    Thank you

## 2023-08-23 NOTE — TELEPHONE ENCOUNTER
"    Caller: Leonid Pittman \"JOHANA\"    Relationship to patient: Self    Best call back number: [phone]    Date of positive COVID19 test: 8/21/23    COVID19 symptoms: CONGESTION, HEADACHE     Additional information or concerns: PATIENT WOULD LIKE A CALL BACK TO DISCUSS NEXT STEPS AND TREATMENT OPTIONS.       What is the patients preferred pharmacy: [id number] [address] [phone] [place] [phone] FX   "

## 2023-09-05 ENCOUNTER — OFFICE VISIT (OUTPATIENT)
Dept: INTERNAL MEDICINE | Facility: CLINIC | Age: 68
End: 2023-09-05
Payer: COMMERCIAL

## 2023-09-05 VITALS
OXYGEN SATURATION: 98 % | HEIGHT: 71 IN | BODY MASS INDEX: 25.98 KG/M2 | DIASTOLIC BLOOD PRESSURE: 80 MMHG | WEIGHT: 185.6 LBS | TEMPERATURE: 98 F | HEART RATE: 78 BPM | SYSTOLIC BLOOD PRESSURE: 138 MMHG | RESPIRATION RATE: 18 BRPM

## 2023-09-05 DIAGNOSIS — E78.2 MIXED HYPERLIPIDEMIA: Primary | ICD-10-CM

## 2023-09-05 DIAGNOSIS — I10 PRIMARY HYPERTENSION: ICD-10-CM

## 2023-09-05 PROCEDURE — 99214 OFFICE O/P EST MOD 30 MIN: CPT | Performed by: INTERNAL MEDICINE

## 2023-09-05 NOTE — PROGRESS NOTES
"Chief Complaint  Hypertension    Subjective        Leonid Pittman presents to [facility]  Hypertension      Following up after increasing blood pressure medicines several weeks ago. Pt taking 10 mg amlodipine and 20 mg ARB - log of Bps ok; a couple of highs over 140s and a couple of lows 90s/50s that pt is not symptomatic. BP cuff checked in office and manual 138/80 with BP machine 131/74. Pt reporting significant sexual side effects since increasing amlodipine dose.    Objective   Vital Signs:  /80   Pulse 78   Temp 98 °F (36.7 °C)   Resp 18   Ht 180.3 cm (71\")   Wt 84.2 kg (185 lb 9.6 oz)   SpO2 98%   BMI 25.89 kg/m²   Estimated body mass index is 25.89 kg/m² as calculated from the following:    Height as of this encounter: 180.3 cm (71\").    Weight as of this encounter: 84.2 kg (185 lb 9.6 oz).             Physical Exam  Constitutional:       Appearance: Normal appearance.   Cardiovascular:      Rate and Rhythm: Normal rate and regular rhythm.      Pulses: Normal pulses.   Pulmonary:      Effort: Pulmonary effort is normal.   Musculoskeletal:      Right lower leg: No edema.      Left lower leg: No edema.   Skin:     General: Skin is warm and dry.      Capillary Refill: Capillary refill takes less than 2 seconds.   Neurological:      General: No focal deficit present.      Mental Status: He is alert and oriented to person, place, and time.      Result Review :                   Assessment and Plan   Diagnoses and all orders for this visit:    1. Mixed hyperlipidemia (Primary)    2. Primary hypertension        Pt with general good control of BP since increasing CCB and continuing ARB. However, with side effects would prefer to change to a different medication to reach goal. Likely would benefit from combo of HCTZ and ARB        Follow Up   No follow-ups on file.  Patient was given instructions and counseling regarding his condition or for health " maintenance advice. Please see specific information pulled into the AVS if appropriate.

## 2023-10-12 ENCOUNTER — TELEPHONE (OUTPATIENT)
Dept: INTERNAL MEDICINE | Facility: CLINIC | Age: 68
End: 2023-10-12
Payer: MEDICARE

## 2023-10-12 NOTE — TELEPHONE ENCOUNTER
Patients provider will be off boarding on 12/1/23. Called patient to reschedule 12/22 appointment with another provider in the office. Patient wants to think about it and call us back.    Hub to read.

## 2023-10-17 DIAGNOSIS — I10 ESSENTIAL HYPERTENSION: ICD-10-CM

## 2023-10-17 RX ORDER — PROPRANOLOL HYDROCHLORIDE 10 MG/1
10 TABLET ORAL 2 TIMES DAILY PRN
Qty: 180 TABLET | Refills: 2 | Status: SHIPPED | OUTPATIENT
Start: 2023-10-17

## 2023-11-07 ENCOUNTER — OFFICE VISIT (OUTPATIENT)
Dept: INTERNAL MEDICINE | Facility: CLINIC | Age: 68
End: 2023-11-07
Payer: MEDICARE

## 2023-11-07 VITALS
TEMPERATURE: 98 F | HEIGHT: 71 IN | SYSTOLIC BLOOD PRESSURE: 146 MMHG | HEART RATE: 64 BPM | OXYGEN SATURATION: 99 % | WEIGHT: 187 LBS | BODY MASS INDEX: 26.18 KG/M2 | DIASTOLIC BLOOD PRESSURE: 84 MMHG

## 2023-11-07 DIAGNOSIS — I95.1 ORTHOSTASIS: ICD-10-CM

## 2023-11-07 DIAGNOSIS — R00.2 PALPITATIONS: Primary | ICD-10-CM

## 2023-11-07 DIAGNOSIS — R42 LIGHTHEADEDNESS: ICD-10-CM

## 2023-11-07 PROCEDURE — 99214 OFFICE O/P EST MOD 30 MIN: CPT | Performed by: INTERNAL MEDICINE

## 2023-11-07 RX ORDER — OLMESARTAN MEDOXOMIL 20 MG/1
20 TABLET ORAL DAILY
Qty: 90 TABLET | Refills: 2 | Status: SHIPPED | OUTPATIENT
Start: 2023-11-07

## 2023-11-07 NOTE — PROGRESS NOTES
"Chief Complaint  Fatigue, Dizziness (Fainted on Friday), and Med Refill (Benicar)    Subjective        Leonid Pittman presents to [facility]  History of Present Illness  Here with recent fatigue, 1-2 weeks; has had a lot of stress and strenuous activity with a recent move to a new home; 4 days ago with a loss of consciousness episode    Has also noticed some gait changes the last few to several weeks; has had friends notice some shuffling gait at times; did have issues with piriformis syndrome years ago that was causing right sided chronic sciatic issues for years    Has been following with a [facility] doctor for T replacement pellets; felt that prior to these above issues that these insertions were deeper than average    Has also noticed some sleep changes the last few weeks during this time, feels his ambien is not working as well      Objective   Vital Signs:  /84 (Patient Position: Standing)   Pulse 64   Temp 98 °F (36.7 °C)   Ht 180.3 cm (71\")   Wt 84.8 kg (187 lb)   SpO2 99%   BMI 26.08 kg/m²   Estimated body mass index is 26.08 kg/m² as calculated from the following:    Height as of this encounter: 180.3 cm (71\").    Weight as of this encounter: 84.8 kg (187 lb).               Physical Exam  Vitals and nursing note reviewed.   Constitutional:       General: He is not in acute distress.     Appearance: Normal appearance.   HENT:      Head: Normocephalic and atraumatic.      Right Ear: External ear normal.      Left Ear: External ear normal.      Nose: Nose normal.      Mouth/Throat:      Mouth: Mucous membranes are moist.      Pharynx: Oropharynx is clear.   Eyes:      Extraocular Movements: Extraocular movements intact.      Conjunctiva/sclera: Conjunctivae normal.      Pupils: Pupils are equal, round, and reactive to light.   Cardiovascular:      Rate and Rhythm: Normal rate and regular rhythm.      Pulses: Normal pulses.      Heart sounds: Normal heart " sounds. No murmur heard.     No gallop.   Pulmonary:      Effort: Pulmonary effort is normal.      Breath sounds: Normal breath sounds.   Abdominal:      General: Abdomen is flat. Bowel sounds are normal. There is no distension.      Palpations: Abdomen is soft. There is no mass.      Tenderness: There is no abdominal tenderness.   Musculoskeletal:         General: No swelling. Normal range of motion.      Cervical back: Normal range of motion and neck supple.   Skin:     General: Skin is warm and dry.      Findings: No rash.   Neurological:      General: No focal deficit present.      Mental Status: He is alert and oriented to person, place, and time. Mental status is at baseline.   Psychiatric:         Mood and Affect: Mood normal.         Behavior: Behavior normal.        Result Review :              ECG 12 Lead    Date/Time: 11/8/2023 9:21 PM  Performed by: Tulio Carbone MD    Authorized by: Tulio Carbone MD  Comparison: compared with previous ECG from 2/10/2023  Similar to previous ECG  Rhythm: sinus rhythm  Rate: normal  Conduction: conduction normal  ST Segments: ST segments normal  T Waves: T waves normal  QRS axis: normal    Clinical impression: normal ECG            Assessment and Plan   Diagnoses and all orders for this visit:    1. Palpitations (Primary)  -     ECG 12 Lead    2. Lightheadedness    3. Orthostasis    - EKG today ok; orthostatics normal; suspect volume depletion in setting of multiple BP meds recently with lower Bps; counseled on rest, fluids/hydration, supportive care  - will hold ccb amlodipine for next few days and monitor BP closely  - counseled on sleep hygiene, rest  - rtc worsening, change in illness         Follow Up   No follow-ups on file.  Patient was given instructions and counseling regarding his condition or for health maintenance advice. Please see specific information pulled into the AVS if appropriate.

## 2023-11-07 NOTE — TELEPHONE ENCOUNTER
Caller: [facility] - [address] - [phone] [facility] [phone] FX    Relationship: Pharmacy    Best call back number: [phone]    Requested Prescriptions:   Requested Prescriptions     Pending Prescriptions Disp Refills    olmesartan (BENICAR) 20 MG tablet       Sig: Take 1 tablet by mouth Daily.        Pharmacy where request should be sent: [facility] - [address] - [phone] [facility] [phone] FX     Last office visit with prescribing clinician: 9/5/2023   Last telemedicine visit with prescribing clinician: Visit date not found   Next office visit with prescribing clinician: 12/22/2023     Additional details provided by patient:     Does the patient have less than a 3 day supply:  [] Yes  [] No    Would you like a call back once the refill request has been completed: [] Yes [] No    If the office needs to give you a call back, can they leave a voicemail: [] Yes [] No    Ricky Ansari Rep   11/07/23 09:33 EST

## 2023-11-07 NOTE — TELEPHONE ENCOUNTER
Rx Refill Note  Requested Prescriptions     Pending Prescriptions Disp Refills    olmesartan (BENICAR) 20 MG tablet       Sig: Take 1 tablet by mouth Daily.      Last office visit with prescribing clinician: 9/5/2023   Last telemedicine visit with prescribing clinician: Visit date not found   Next office visit with prescribing clinician: 12/22/2023                         Would you like a call back once the refill request has been completed: [] Yes [] No    If the office needs to give you a call back, can they leave a voicemail: [] Yes [] No    Augusta Carney MA  11/07/23, 09:43 EST

## 2023-11-08 PROCEDURE — 93000 ELECTROCARDIOGRAM COMPLETE: CPT | Performed by: INTERNAL MEDICINE

## 2023-11-09 RX ORDER — OLMESARTAN MEDOXOMIL 40 MG/1
20 TABLET ORAL DAILY
Qty: 90 TABLET | Refills: 1 | Status: SHIPPED | OUTPATIENT
Start: 2023-11-09

## 2023-11-09 NOTE — TELEPHONE ENCOUNTER
Rx Refill Note  Requested Prescriptions     Pending Prescriptions Disp Refills    olmesartan (BENICAR) 40 MG tablet [Pharmacy Med Name: olmesartan 40 mg tablet] 90 tablet 1     Sig: TAKE 1/2 TABLET BY MOUTH DAILY.      Last office visit with prescribing clinician: 11/7/2023   Last telemedicine visit with prescribing clinician: Visit date not found   Next office visit with prescribing clinician: 12/22/2023                         Would you like a call back once the refill request has been completed: [] Yes [] No    If the office needs to give you a call back, can they leave a voicemail: [] Yes [] No    Augusta Carney MA  11/09/23, 08:19 EST

## 2023-11-27 ENCOUNTER — TELEPHONE (OUTPATIENT)
Dept: INTERNAL MEDICINE | Facility: CLINIC | Age: 68
End: 2023-11-27

## 2023-11-27 NOTE — TELEPHONE ENCOUNTER
"  Caller: Leonid Pittman \"JOHANA\"    Relationship: Self    Best call back number: [phone]     What is the best time to reach you: ANY    Who are you requesting to speak with (clinical staff, provider,  specific staff member): CLINICAL        What was the call regarding: PATIENT WILL BE FOLLOWING DR PONCE WHEREVER HE IS GOING      "

## 2024-05-01 ENCOUNTER — TRANSCRIBE ORDERS (OUTPATIENT)
Dept: PHYSICAL THERAPY | Facility: CLINIC | Age: 69
End: 2024-05-01
Payer: MEDICARE

## 2024-05-01 DIAGNOSIS — M54.42 LOW BACK PAIN WITH LEFT-SIDED SCIATICA, UNSPECIFIED BACK PAIN LATERALITY, UNSPECIFIED CHRONICITY: Primary | ICD-10-CM

## 2024-05-14 ENCOUNTER — TREATMENT (OUTPATIENT)
Dept: PHYSICAL THERAPY | Facility: CLINIC | Age: 69
End: 2024-05-14
Payer: MEDICARE

## 2024-05-14 DIAGNOSIS — M62.89 MUSCLE TIGHTNESS: ICD-10-CM

## 2024-05-14 DIAGNOSIS — M53.86 DECREASED ROM OF LUMBAR SPINE: ICD-10-CM

## 2024-05-14 DIAGNOSIS — M54.42 LEFT-SIDED LOW BACK PAIN WITH LEFT-SIDED SCIATICA, UNSPECIFIED CHRONICITY: Primary | ICD-10-CM

## 2024-05-14 PROCEDURE — 97140 MANUAL THERAPY 1/> REGIONS: CPT | Performed by: PHYSICAL THERAPIST

## 2024-05-14 PROCEDURE — 97110 THERAPEUTIC EXERCISES: CPT | Performed by: PHYSICAL THERAPIST

## 2024-05-14 PROCEDURE — 97035 APP MDLTY 1+ULTRASOUND EA 15: CPT | Performed by: PHYSICAL THERAPIST

## 2024-05-14 PROCEDURE — 97161 PT EVAL LOW COMPLEX 20 MIN: CPT | Performed by: PHYSICAL THERAPIST

## 2024-05-14 NOTE — PROGRESS NOTES
Physical Therapy Initial Evaluation and Plan of Care    Patient: Leonid Pittman   : 1955  Diagnosis/ICD-10 Code:  Left-sided low back pain with left-sided sciatica, unspecified chronicity [M54.42]  Referring practitioner: Tulio Carbone MD  Date of Initial Visit: 2024  Today's Date: 2024  Patient seen for 1 session         Visit Diagnoses:    ICD-10-CM ICD-9-CM   1. Left-sided low back pain with left-sided sciatica, unspecified chronicity  M54.42 724.3   2. Muscle tightness  M62.89 728.9   3. Decreased ROM of lumbar spine  M53.86 724.9         Subjective Questionnaire: back index 50=56%      Subjective Evaluation    History of Present Illness  Onset date: increased in the last month.  Mechanism of injury: Pt is a 69 y/o white male who reports to the clinic with LBP R> L.  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  Pt has had piriformis issues on the right side and was treated by PT with success.  Pt denies having any prior injury or surgery to his low back.  No epidural injections.  Has seen a Neurosurgeon in the past-ordered an MRI, but recommended no surgery-did PT.  Pt has had a PT come to his office and treat his neck, but is no longer receiving treatment.  Pt had his Physical with PCP-referred pt to PT for his LBP.      PMH: Has had Prostate CA-treated with surgery-Has not detected PSA in 4 yrs, Pt with a family Hx of heart disease-pt went to a Cardio 2 yrs ago and was cleared.        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 Quality of life: good    Pain  Current pain rating: 3  At worst pain ratin  Location: lower lumbar pain  Quality: dull ache and sharp  Relieving factors: medications (ibuprofen PRN, muscle patch icy hot)  Aggravating factors: sleeping and lifting (difficulty dressing his LE, toss and turn at night, has pain from bending over his patient's chairs to see into their  mouths)    Hand dominance: right    Diagnostic Tests  No diagnostic tests performed    Treatments  Previous treatment: massage and physical therapy (saw a Massage Therapist for 10 sessions, has done dry needling)  Current treatment: massage and medication  Patient Goals  Patient goals for therapy: decreased pain  Patient goal: wants to get a good night sleep         Objective          Postural Observations  Seated posture: fair  Standing posture: good    Additional Postural Observation Details  Left iliac crest slightly elevated  Flattened lumbar lordosis     Forward rounded shoulders     Palpation     Right   Hypertonic in the lumbar paraspinals. Tenderness of the lumbar paraspinals.     Additional Palpation Details  Moderate right T10-L3 PS tenderness     Neurological Testing     Sensation     Lumbar   Left   Intact: light touch    Right   Intact: light touch    Active Range of Motion     Lumbar   Flexion: Active lumbar flexion: 50% with pain  Extension: Active lumbar extension: 25%   Left lateral flexion: Active left lumbar lateral flexion: 25% with pain  Right lateral flexion: Active right lumbar lateral flexion: 50% with pain  Left rotation: WFL  Right rotation: with pain    Strength/Myotome Testing     Left Hip   Planes of Motion   Flexion: 5    Right Hip   Planes of Motion   Flexion: 5    Left Knee   Flexion: 5  Extension: 5    Right Knee   Flexion: 5  Extension: 5    Left Ankle/Foot   Dorsiflexion: 5  Plantar flexion: 5  Eversion: 5  Great toe extension: 5    Right Ankle/Foot   Dorsiflexion: 5  Plantar flexion: 5  Eversion: 5  Great toe extension: 5    Additional Strength Details  Upper abdominal strength 2+/5 due to pain in lower back   Trunk Ext 3/5     Tests     Lumbar     Left   Positive femoral stretch.   Negative crossed SLR and passive SLR.     Right   Negative crossed SLR, femoral stretch and passive SLR.     Left Hip   Positive Gillet's, long sit and piriformis.   Negative Ely's, ZULMA and FADIR.    90/90 SLR: Negative.   SLR: Negative.     Right Hip   Positive ZULMA, long sit and piriformis.   Negative Ely's, FADIR and Gillet's.   90/90 SLR: Positive.   SLR: Positive.     Additional Tests Details  Pt with left LBP with left prone knee flex   Mild tightness with hamstring B     Left leg anterior pelvic innominate     Mild tightness B hip IR           Assessment & Plan       Assessment  Impairments: abnormal muscle tone, abnormal or restricted ROM, impaired physical strength, lacks appropriate home exercise program and pain with function   Functional limitations: lifting, sleeping, uncomfortable because of pain, moving in bed, standing and unable to perform repetitive tasks   Assessment details: Pt is a 67 y/o WM who reports to the clinic with LBP pain, decreased flexibility, tenderness, decreased lumbar AROM, and pain with standing, bending, and lifting. Pt's profession of being a Periodontist and personal factors of playing golf, which may affect progress in plan of care. Signs and symptoms are consistent with physical therapy diagnosis of LBP with sciatica. Patient is appropriate for skilled PT to address impairments and reduce pain in order to improve ease with daily mobility and ADLs.  Pt was educated on course of treatment, anatomy of the spine, possible causes of his LBP, position changes/ways to stretch throughout his day in the office, use of heat/ice, and pt was issued a copy of his HEP.            Prognosis: good    Goals  Plan Goals: STGs: 2-4 weeks  1.  Decrease LBP to a < or = 4/10 with standing, bending over, and sleeping.    2.  Increase lumbar Flex, B SB and B ROT AROM to at least 75% of normal range for improved ability to perform his ADLs.     3.  Decrease right lumbar PS tenderness to minimal to none for improved ability to perform ADLs and sleep through the night.    4.  Increase B hamstring flexibility to minimal with 90-90 test for increased ability to dress his LEs.      LTGs: 6-8  weeks  1.  Decrease LBP to a < or = 2/10 with standing, bending over and sleeping through the night.  2.  Restore normal pelvic alignment without needing MET for at least 2 weeks to improve pt's ability to dress his LE's and sleep through the night without pain.    3.  Pt is independent with HEP for self management of symptoms.    4.  Increase pt's upper abdominal and trunk strength to 3+-4/5 for improved posture and to decompress the lumbar spine.          Plan  Therapy options: will be seen for skilled therapy services  Planned modality interventions: cryotherapy, electrical stimulation/Swazi stimulation, ultrasound, traction, thermotherapy (hydrocollator packs) and dry needling  Planned therapy interventions: abdominal trunk stabilization, flexibility, functional ROM exercises, home exercise program, stretching, strengthening, spinal/joint mobilization, manual therapy, neuromuscular re-education, soft tissue mobilization and therapeutic activities  Frequency: 2x week  Duration in weeks: 8  Treatment plan discussed with: patient          Timed:         Manual Therapy:    10     mins  62047;     Therapeutic Exercise:   22      mins  33583;     Neuromuscular Sunday:        mins  61212;    Therapeutic Activity:          mins  09357;     Gait Training:           mins  67169;     Ultrasound:     8     mins  96351;    Ionto                                   mins   24337  Self Care                            mins   77626  Canalith Repos         mins 45425      Un-Timed:  Electrical Stimulation:         mins  64675 ( );  Dry Needling          mins self-pay  Traction          mins 76955  Low Eval     20     Mins  96550  Mod Eval          Mins  63553  High Eval                            Mins  23402      Timed Treatment:  40    mins   Total Treatment:    60    mins      PT: Jannie Arana PT     License Number: [id number]  Electronically signed by Jannie Arana PT, 05/14/24, 8:38 AM EDT    Certification Period:  5/14/2024 thru 8/11/2024  I certify that the therapy services are furnished while this patient is under my care.  The services outlined above are required by this patient, and will be reviewed every 90 days.         Physician Signature:__________________________________________________    PHYSICIAN: Tulio Carbone MD  NPI: [id number]                                      DATE:      Please sign and return via fax to [email or web address] . Thank you, [facility].

## 2024-05-17 ENCOUNTER — TREATMENT (OUTPATIENT)
Dept: PHYSICAL THERAPY | Facility: CLINIC | Age: 69
End: 2024-05-17
Payer: MEDICARE

## 2024-05-17 DIAGNOSIS — M53.86 DECREASED ROM OF LUMBAR SPINE: ICD-10-CM

## 2024-05-17 DIAGNOSIS — M62.89 MUSCLE TIGHTNESS: ICD-10-CM

## 2024-05-17 DIAGNOSIS — M54.42 LEFT-SIDED LOW BACK PAIN WITH LEFT-SIDED SCIATICA, UNSPECIFIED CHRONICITY: Primary | ICD-10-CM

## 2024-05-17 PROCEDURE — 97035 APP MDLTY 1+ULTRASOUND EA 15: CPT | Performed by: PHYSICAL THERAPIST

## 2024-05-17 PROCEDURE — 97140 MANUAL THERAPY 1/> REGIONS: CPT | Performed by: PHYSICAL THERAPIST

## 2024-05-17 NOTE — PROGRESS NOTES
Physical Therapy Daily Treatment Note    Patient: Leonid Pittman   : 1955  Referring practitioner: Tulio Carbone MD  Date of Initial Visit: Type: THERAPY  Noted: 2024  Today's Date: 2024  Patient seen for 2 sessions       Visit Diagnoses:    ICD-10-CM ICD-9-CM   1. Left-sided low back pain with left-sided sciatica, unspecified chronicity  M54.42 724.3   2. Muscle tightness  M62.89 728.9   3. Decreased ROM of lumbar spine  M53.86 724.9       Leonid Pittman reports: his back has been better since his last session.  Pt had a huge surgery yesterday with minimal pain rating it a 3/10.  Pt took a quick break and stretched his back during the surgery.  Pt was able to sleep last night and used the pillow between his knees and he was able to sleep much better.       Subjective       Objective   See Exercise, Manual, and Modality Logs for complete treatment.     Pt with mild right mid thoracic PS tightness and tenderness  Removed KT tape to perform massage.  Pt had minimal skin irritation once tape was removed on ends of tape.  Used MOM today and used shorter strips, so the areas irritated were not covered by the KT tape today.  Educated pt to remove tape if his skin felt irritated or started itching.  Did not do exercises due to pt having his appointment time mixed up, so limited time today.  Pt will do exercises at home.      Assessment & Plan       Assessment  Assessment details: Pt is responding to treatment with decreasing pain and tightness over the right mid thoracic PS.  Pt reported less pain at work and improved sleep.  Continued with deep tissue massage and US treatment today.  Pt will perform exercises at home due to limited time.  Will continue to see pt 1-2x/week for stretching, strengthening and modalities PRN for pain.          Progress per Plan of Care      Timed:         Manual Therapy:    15     mins  70714;     Therapeutic Exercise:         mins  26526;     Neuromuscular Sunday:         mins  61061;    Therapeutic Activity:          mins  09470;     Gait Training:           mins  53541;     Ultrasound:     8     mins  46684;    Ionto                                   mins   49863  Self Care                            mins   05485  Canalith Repos         mins 11881      Un-Timed:  Electrical Stimulation:         mins  48663 ( );  Dry Needling          mins self-pay  Traction          mins 27769      Timed Treatment:   23   mins   Total Treatment:     30   mins    Jannie Arana, PT  [place] License: [id number]

## 2024-05-21 ENCOUNTER — TREATMENT (OUTPATIENT)
Dept: PHYSICAL THERAPY | Facility: CLINIC | Age: 69
End: 2024-05-21
Payer: MEDICARE

## 2024-05-21 DIAGNOSIS — M53.86 DECREASED ROM OF LUMBAR SPINE: ICD-10-CM

## 2024-05-21 DIAGNOSIS — M54.42 LEFT-SIDED LOW BACK PAIN WITH LEFT-SIDED SCIATICA, UNSPECIFIED CHRONICITY: Primary | ICD-10-CM

## 2024-05-21 DIAGNOSIS — M62.89 MUSCLE TIGHTNESS: ICD-10-CM

## 2024-05-21 PROCEDURE — 97035 APP MDLTY 1+ULTRASOUND EA 15: CPT | Performed by: PHYSICAL THERAPIST

## 2024-05-21 PROCEDURE — 97530 THERAPEUTIC ACTIVITIES: CPT | Performed by: PHYSICAL THERAPIST

## 2024-05-21 PROCEDURE — 97110 THERAPEUTIC EXERCISES: CPT | Performed by: PHYSICAL THERAPIST

## 2024-05-21 NOTE — PROGRESS NOTES
Physical Therapy Daily Treatment Note    Patient: Leonid Pittman   : 1955  Referring practitioner: Tulio Carbone MD  Date of Initial Visit: Type: THERAPY  Noted: 2024  Today's Date: 2024  Patient seen for 3 sessions       Visit Diagnoses:    ICD-10-CM ICD-9-CM   1. Left-sided low back pain with left-sided sciatica, unspecified chronicity  M54.42 724.3   2. Muscle tightness  M62.89 728.9   3. Decreased ROM of lumbar spine  M53.86 724.9       Leonid Pittman reports: he feels really sore from doing the HEP.  He has trouble getting off the floor after doing the exercises.      Subjective       Objective   See Exercise, Manual, and Modality Logs for complete treatment.     Pt without skin irritation today when KT tape was removed.  Pt with minimal palpable tightness over the right thoracic/lumbar PS     Assessment & Plan       Assessment  Assessment details: Pt is responding to treatment with decreasing pain and tightness over the right mid thoracic PS. Added DKTC, corner stretch, and open books to improve posture and mobility in the spine.  Continued with US treatment and KT taping to decrease pain and tightness.  Will continue to see pt 1-2x/week for stretching, strengthening and modalities PRN for pain.          Progress per Plan of Care      Timed:         Manual Therapy:         mins  04459;     Therapeutic Exercise:   21      mins  87105;     Neuromuscular Sunday:        mins  33176;    Therapeutic Activity:    10      mins  03005;     Gait Training:           mins  89334;     Ultrasound:     8     mins  81175;    Ionto                                   mins   38701  Self Care                            mins   02040  Canalith Repos         mins 87705      Un-Timed:  Electrical Stimulation:         mins  24817 ( );  Dry Needling          mins self-pay  Traction          mins 61508      Timed Treatment:  39    mins   Total Treatment:    47    mins    Jannie Arana, PT  [place] License:  [id number]

## 2024-05-24 ENCOUNTER — TREATMENT (OUTPATIENT)
Dept: PHYSICAL THERAPY | Facility: CLINIC | Age: 69
End: 2024-05-24
Payer: MEDICARE

## 2024-05-24 DIAGNOSIS — M54.42 LEFT-SIDED LOW BACK PAIN WITH LEFT-SIDED SCIATICA, UNSPECIFIED CHRONICITY: Primary | ICD-10-CM

## 2024-05-24 DIAGNOSIS — M62.89 MUSCLE TIGHTNESS: ICD-10-CM

## 2024-05-24 DIAGNOSIS — M53.86 DECREASED ROM OF LUMBAR SPINE: ICD-10-CM

## 2024-05-24 PROCEDURE — 97110 THERAPEUTIC EXERCISES: CPT | Performed by: PHYSICAL THERAPIST

## 2024-05-24 PROCEDURE — 97035 APP MDLTY 1+ULTRASOUND EA 15: CPT | Performed by: PHYSICAL THERAPIST

## 2024-05-24 PROCEDURE — 97530 THERAPEUTIC ACTIVITIES: CPT | Performed by: PHYSICAL THERAPIST

## 2024-05-24 NOTE — PROGRESS NOTES
Physical Therapy Daily Treatment Note    Patient: Leonid Pittman   : 1955  Referring practitioner: Tulio Carbone MD  Date of Initial Visit: Type: THERAPY  Noted: 2024  Today's Date: 2024  Patient seen for 4 sessions       Visit Diagnoses:    ICD-10-CM ICD-9-CM   1. Left-sided low back pain with left-sided sciatica, unspecified chronicity  M54.42 724.3   2. Muscle tightness  M62.89 728.9   3. Decreased ROM of lumbar spine  M53.86 724.9       Leonid Pittman reports: it is doing better.  He has not been good at his HEP.      Subjective       Objective   See Exercise, Manual, and Modality Logs for complete treatment.     Good right thoracic PS flexibility today.    Moderate left UT/levator tightness     Assessment & Plan       Assessment  Assessment details: Pt is responding to treatment with decreasing pain and tightness over the right mid thoracic PS. Pt is c/o left cervical pain and tightness, so added B UT and levator stretching.  Pt needed VC and tactile cues to perform neck stretches correctly. Issued pt an updated HEP.  Continued with US treatment and KT taping to decrease pain and tightness.  Will continue to see pt 1-2x/week for stretching, strengthening and modalities PRN for pain.  Pt is scheduling a dry needling appointment.            Progress per Plan of Care      Timed:         Manual Therapy:         mins  95496;     Therapeutic Exercise:   22      mins  64526;     Neuromuscular Sunday:        mins  34498;    Therapeutic Activity:    15      mins  91194;     Gait Training:           mins  53396;     Ultrasound:     8     mins  71832;    Ionto                                   mins   79675  Self Care                            mins   78197  Canalith Repos         mins 00729      Un-Timed:  Electrical Stimulation:         mins  89788 ( );  Dry Needling          mins self-pay  Traction          mins 83015      Timed Treatment:   45   mins   Total Treatment:    48    mins    Jannie  Pérez Arana, PT  [place] License: [id number]    Jerman Block, Student PT

## 2024-05-31 ENCOUNTER — TREATMENT (OUTPATIENT)
Dept: PHYSICAL THERAPY | Facility: CLINIC | Age: 69
End: 2024-05-31
Payer: MEDICARE

## 2024-05-31 DIAGNOSIS — M53.86 DECREASED ROM OF LUMBAR SPINE: ICD-10-CM

## 2024-05-31 DIAGNOSIS — M54.42 LEFT-SIDED LOW BACK PAIN WITH LEFT-SIDED SCIATICA, UNSPECIFIED CHRONICITY: Primary | ICD-10-CM

## 2024-05-31 DIAGNOSIS — M62.89 MUSCLE TIGHTNESS: ICD-10-CM

## 2024-05-31 PROCEDURE — 97035 APP MDLTY 1+ULTRASOUND EA 15: CPT | Performed by: PHYSICAL THERAPIST

## 2024-05-31 PROCEDURE — 97110 THERAPEUTIC EXERCISES: CPT | Performed by: PHYSICAL THERAPIST

## 2024-05-31 PROCEDURE — 97140 MANUAL THERAPY 1/> REGIONS: CPT | Performed by: PHYSICAL THERAPIST

## 2024-05-31 PROCEDURE — 97530 THERAPEUTIC ACTIVITIES: CPT | Performed by: PHYSICAL THERAPIST

## 2024-05-31 NOTE — PROGRESS NOTES
Physical Therapy Daily Progress Note    Patient: Leonid Pittman   : 1955  Diagnosis/ICD-10 Code:  Left-sided low back pain with left-sided sciatica, unspecified chronicity [M54.42]  Referring practitioner: Tulio Carbone MD  Date of Initial Visit: Type: THERAPY  Noted: 2024  Today's Date: 2024  Patient seen for 5 sessions         Leonid Pittman reports: Pt reports that his back is feeling better today but is still a little stiff. Pt also reports that his L hip is bothering him a little bit today. Pt states that he had a golf lesson yesterday and was able to move around a lot more than his previous golf lessons.        Subjective     Objective   See Exercise, Manual, and Modality Logs for complete treatment.     Pt presented with mild B UT and Levator Scap tightness prior to STM. Pt's cervical mobility was visibly increased after STM with palpable decrease in muscle tightness.    Assessment & Plan       Assessment  Assessment details: Treatment session continued to focus on cervical, thoracic, and lumbar mobility exercises. Pt performed well with all of his exercises with verbal and tactile cueing for proper form. Pt received STM to R lumbar PS and B UT/Levator Scap which helped improve his cervical and lumbar mobility. Pt received US to R lumbar PS which he tolerated well with no issues. Pt shows good improvement with cervical and lumbar mobility and will continue to benefit from PT to further improve his mobility and reduce his pain.        Progress per Plan of Care         Manual Therapy:     12    mins  46033;  Therapeutic Exercise:     18    mins  00951;     Neuromuscular Sunday:        mins  88944;    Therapeutic Activity:     15    mins  24755;     Gait Training:           mins  50850;     Ultrasound:     8     mins  99614;    Electrical Stimulation:         mins  91342 ( );  Dry Needling          mins self-pay    Timed Treatment:    53 mins   Total Treatment:     53    mins    Jannie Arana, PT  Physical Therapist    Jerman Block, Student PT

## 2024-05-31 NOTE — PROGRESS NOTES
Physical Therapy Daily Progress Note  9::54    Patient: Leonid Pittman   : 1955  Diagnosis/ICD-10 Code:  Left-sided low back pain with left-sided sciatica, unspecified chronicity [M54.42]  Referring practitioner: Tulio Carbone MD  Date of Initial Visit: Type: THERAPY  Noted: 2024  Today's Date: 2024  Patient seen for 5 sessions         Leonid Pittman reports: Pt reports that his back is feeling better today but is still a little stiff. Pt also reports that his L hip is bothering him a little bit today. Pt states that he had a golf lesson yesterday and was able to move around a lot more than his previous golf lessons.        Subjective     Objective   See Exercise, Manual, and Modality Logs for complete treatment.     Pt presented with mild B UT and Levator Scap tightness prior to STM. Pt's cervical mobility was visibly increased after STM with palpable decrease in muscle tightness.    Assessment & Plan       Assessment  Assessment details: Treatment session continued to focus on cervical, thoracic, and lumbar mobility exercises. Pt performed well with all of his exercises with verbal and tactile cueing for proper form. Pt received STM to R lumbar PS and B UT/Levator Scap which helped improve his cervical and lumbar mobility. Pt received US to R lumbar PS which he tolerated well with no issues. Pt shows good improvement with cervical and lumbar mobility and will continue to benefit from PT to further improve his mobility and reduce his pain.        Progress per Plan of Care         Manual Therapy:     12    mins  25151;  Therapeutic Exercise:     18    mins  94495;     Neuromuscular Sunday:        mins  35982;    Therapeutic Activity:     15    mins  61653;     Gait Training:           mins  80968;     Ultrasound:     8     mins  51973;    Electrical Stimulation:         mins  75951 ( );  Dry Needling          mins self-pay    Timed Treatment:    53 mins   Total Treatment:     53    mins    Jannie Arana, PT  Physical Therapist    Jerman Block, Student PT

## 2024-06-04 ENCOUNTER — TREATMENT (OUTPATIENT)
Dept: PHYSICAL THERAPY | Facility: CLINIC | Age: 69
End: 2024-06-04

## 2024-06-04 ENCOUNTER — TREATMENT (OUTPATIENT)
Dept: PHYSICAL THERAPY | Facility: CLINIC | Age: 69
End: 2024-06-04
Payer: MEDICARE

## 2024-06-04 DIAGNOSIS — M54.42 LEFT-SIDED LOW BACK PAIN WITH LEFT-SIDED SCIATICA, UNSPECIFIED CHRONICITY: Primary | ICD-10-CM

## 2024-06-04 DIAGNOSIS — M62.89 MUSCLE TIGHTNESS: ICD-10-CM

## 2024-06-04 DIAGNOSIS — M53.86 DECREASED ROM OF LUMBAR SPINE: ICD-10-CM

## 2024-06-04 PROCEDURE — 20561 NDL INSJ W/O NJX 3+ MUSC: CPT | Performed by: PHYSICAL THERAPIST

## 2024-06-04 PROCEDURE — 97140 MANUAL THERAPY 1/> REGIONS: CPT | Performed by: PHYSICAL THERAPIST

## 2024-06-04 PROCEDURE — 97110 THERAPEUTIC EXERCISES: CPT | Performed by: PHYSICAL THERAPIST

## 2024-06-04 NOTE — PROGRESS NOTES
Physical Therapy Dry Needling Note      Patient: Leonid Pittman   : 1955  Diagnosis/ICD-10 Code:  Left-sided low back pain with left-sided sciatica, unspecified chronicity [M54.42]  Referring practitioner: No ref. provider found  Date of Initial Visit: Type: THERAPY  Noted: 2024  Today's Date: 2024  Patient seen for 1 sessions         Leonid Pittman reports: he has had dry needling in the past with good results and due to his job duties spends a lot of time in the forward flexed and rotated position with back and neck tightness.      History:  pt denies active infection, blood bourne illness, needle phobia, use of blood thinners, any compromised immune system, recent surgery      Objective          Palpation   Left   Hypertonic in the cervical paraspinals, levator scapulae, suboccipitals and upper trapezius.   Tenderness of the cervical paraspinals, levator scapulae, suboccipitals and upper trapezius.     Right   Hypertonic in the erector spinae, cervical paraspinals, levator scapulae, lumbar paraspinals, quadratus lumborum, suboccipitals and upper trapezius. Tenderness of the erector spinae, cervical paraspinals, levator scapulae, lumbar paraspinals, quadratus lumborum, suboccipitals and upper trapezius.       See Exercise, Manual, and Modality Logs for complete treatment.     Patient was instructed in indications for dry needling treatment,  conditions treated, procedure to be performed, possible side effects, contraindications, and risks of the procedure.  All questions were answered.  Patient agreed to have dry needling treatment performed and signed the consent.      Assessment/Plan    Used threading and direct techniques, obtained consent to treat after discussing benefits and risks of dry needling. Clean needle technique and gloves used at all times. Precautions utilized for lung fields and neurovascular structures. Pt in supine and prone position for needling of R low back and B cervical  muscles. Positive twitch response. No adverse response noted. Manual palpation and assessment performed before, during and after needling.  Plan to assess response to dry needling and instructed to apply heat to sore areas tonight.          Manual Therapy:    -     mins  70563;  Therapeutic Exercise:    -     mins  33069;     Neuromuscular Sunday:    -    mins  42938;    Therapeutic Activity:     -     mins  24546;     Gait Training:      -     mins  18553;     Ultrasound:     -     mins  88464;    Electrical Stimulation:    -     mins  77835 ( );  Dry Needling     20     mins 16037/60596    Low Eval                       -      Mins  25437  Mod Eval                        -     Mins  92272  High Eval                       -     Mins  71666    Timed Treatment:   20   mins   Total Treatment:     30   mins    Hafsa Muniz PT  Physical Therapist    License #: [id number]

## 2024-06-04 NOTE — PROGRESS NOTES
Physical Therapy Daily Progress Note    Patient: Leonid Pittman   : 1955  Diagnosis/ICD-10 Code:  Left-sided low back pain with left-sided sciatica, unspecified chronicity [M54.42]  Referring practitioner: Tulio Carbone MD  Date of Initial Visit: Type: THERAPY  Noted: 2024  Today's Date: 2024  Patient seen for 6 sessions         Leonid Pittman reports: Pt reports that his neck is feeling really good and that his back is feeling better. Pt reports the massage during last session really helped his neck motion. Pt reports that he is feeling some pain in his left anterior hip.      Subjective     Objective          Tests     Left Hip   Negative long sit.     Right Hip   Positive long sit.     Additional Tests Details  Pt with positive supine to long sitting test for an right anterior pelvic innominate       See Exercise, Manual, and Modality Logs for complete treatment.       Assessment & Plan       Assessment  Assessment details: Treatment session continued to focus on mobility exercises for his neck and low back. Pt tolerated all of his exercises well with no complaints of discomfort. Pt received manual therapy with STM to R lumbar PS and QL which helped improve his lumbar mobility. Pt also underwent anterior pelvic innominate MET.  Pt with good pelvic alignment after treatment.  Held US treatment today due to pt receiving dry needling post treatment from Hafsa Muniz PT.  Pt will continue to benefit from PT to further improve his cervical and lumbar mobility and reduce his pain.        Progress per Plan of Care           Manual Therapy:     11    mins  47170;  Therapeutic Exercise:     22    mins  23881;     Neuromuscular Sunday:        mins  36997;    Therapeutic Activity:          mins  96522;     Gait Training:           mins  01778;     Ultrasound:          mins  03914;    Electrical Stimulation:         mins  52717 ( );  Dry Needling          mins self-pay    Timed Treatment:   33    mins   Total Treatment:     33   mins    Jannie Arana, PT  Physical Therapist    Jerman Block, Student PT

## 2024-06-11 ENCOUNTER — TREATMENT (OUTPATIENT)
Dept: PHYSICAL THERAPY | Facility: CLINIC | Age: 69
End: 2024-06-11
Payer: MEDICARE

## 2024-06-11 DIAGNOSIS — M54.42 LEFT-SIDED LOW BACK PAIN WITH LEFT-SIDED SCIATICA, UNSPECIFIED CHRONICITY: Primary | ICD-10-CM

## 2024-06-11 DIAGNOSIS — M53.86 DECREASED ROM OF LUMBAR SPINE: ICD-10-CM

## 2024-06-11 DIAGNOSIS — M62.89 MUSCLE TIGHTNESS: ICD-10-CM

## 2024-06-11 NOTE — PROGRESS NOTES
Physical Therapy Daily Progress Note    8:31:02    Patient: Leonid Pittman   : 1955  Diagnosis/ICD-10 Code:  Left-sided low back pain with left-sided sciatica, unspecified chronicity [M54.42]  Referring practitioner: Tulio Carbone MD  Date of Initial Visit: Type: THERAPY  Noted: 2024  Today's Date: 2024  Patient seen for 7 sessions         Leonid Pittman reports: Pt reports that he is feeling pretty stiff today due to traveling a lot recently. Pt states he went to [facility] in [place] so he was sitting in the car for a long time.  Pt reports that dry needling last week really helped loosen him up and he felt really good afterward.      Subjective     Objective          Palpation     Additional Palpation Details  Min palpable tightness of B UT, Lev Scap, and Scalenes      See Exercise, Manual, and Modality Logs for complete treatment.       Assessment & Plan       Assessment  Assessment details: Treatment session continued to focus on cervical stretching and flexibility exercises. Pt was able to complete all of his exercises with min cueing for proper form. Pt received manual therapy with STM to B UT, Lev Scap, and Scalenes which he tolerated well. Pt showed improvements upon observation in cervical AROM after manual therapy. Pt has progressed nicely with his treatment plan and is able to complete his exercises independently. Pt will be reevaluated in two weeks and plan to discharge next visit with recommendations to continue with home stretches, heat, and massage as needed.        Anticipate DC next Visit           Manual Therapy:    10     mins  37057;  Therapeutic Exercise:     21    mins  58908;     Neuromuscular Sunday:        mins  82400;    Therapeutic Activity:          mins  58702;     Gait Training:           mins  12997;     Ultrasound:          mins  60446;    Electrical Stimulation:         mins  96949 ( );  Dry Needling          mins self-pay    Timed Treatment:   31   mins    Total Treatment:     31   mins    Jannie Arana, PT  Physical Therapist    Jerman Block, Student PT

## 2024-06-25 ENCOUNTER — TREATMENT (OUTPATIENT)
Dept: PHYSICAL THERAPY | Facility: CLINIC | Age: 69
End: 2024-06-25
Payer: MEDICARE

## 2024-06-25 DIAGNOSIS — M54.42 LEFT-SIDED LOW BACK PAIN WITH LEFT-SIDED SCIATICA, UNSPECIFIED CHRONICITY: Primary | ICD-10-CM

## 2024-06-25 DIAGNOSIS — M53.86 DECREASED ROM OF LUMBAR SPINE: ICD-10-CM

## 2024-06-25 DIAGNOSIS — M62.89 MUSCLE TIGHTNESS: ICD-10-CM

## 2024-06-25 NOTE — PROGRESS NOTES
Re-Assessment / Discharge      Patient: Leonid Pittman   : 1955  Diagnosis/ICD-10 Code:  Left-sided low back pain with left-sided sciatica, unspecified chronicity [M54.42]  Referring practitioner: Tulio Carbone MD  Date of Initial Visit: Type: THERAPY  Noted: 2024  Today's Date: 2024  Patient seen for 8 sessions      Subjective:   Leonid Pittman reports: Pt reports that his back has been doing really well but that his hip has been bothering him more lately. Pt reports back pain of 1/10.    Subjective Questionnaire: Back Index: 5/50 (10%)  Clinical Progress: improved  Home Program Compliance: Yes  Treatment has included: therapeutic exercise, manual therapy, therapeutic activity, ultrasound, and dry needling    Subjective   Objective          Tenderness     Additional Tenderness Details  Min tenderness on palpation of R upper lumbar PS    Active Range of Motion     Lumbar   Flexion: WFL  Extension: WFL  Left lateral flexion: Active left lumbar lateral flexion: slight pain in R hip. WFL  Right lateral flexion: Active right lumbar lateral flexion: slight pain in R hip. WFL  Left rotation: WFL  Right rotation: WFL    Strength/Myotome Testing     Additional Strength Details  Upper Abdominals: 4+/5    Tests     Additional Tests Details  Negative supine to long-sitting test  Min B hamstring tightness with 90-90 test      Assessment & Plan       Assessment  Assessment details: Treatment session focused on reassessment of pt's progress towards goals and further cervical and lumbar stretches. Pt reported decreased back pain to 1/10 and was WFL in all directions with lumbar AROM. Pt showed Lumbosacral alignment WNL and has not needed MET for correction for 3 weeks. Pt showed minimal tenderness in R lumbar PS and min tightness in B hamstrings with 90-90 test. Pt is compliant with HEP and has made great progress with therapy. Pt has currently met all of his therapy goals. Pt was able to complete all of his  exercises today mostly independently. Pt received deep tissue massage and US to R upper lumbar PS which helped improve his lumbar AROM. Pt is now able to manage his symptoms independently and will be discharged this visit with recommendation to continue with his HEP.      Progress toward previous goals: All Met    Plan Goals: STGs: 2-4 weeks  1.  Decrease LBP to a < or = 4/10 with standing, bending over, and sleeping.  MET  2.  Increase lumbar Flex, B SB and B ROT AROM to at least 75% of normal range for improved ability to perform his ADLs.   MET  3.  Decrease right lumbar PS tenderness to minimal to none for improved ability to perform ADLs and sleep through the night.   MET  4.  Increase B hamstring flexibility to minimal with 90-90 test for increased ability to dress his LEs.  MET     LTGs: 6-8 weeks  1.  Decrease LBP to a < or = 2/10 with standing, bending over and sleeping through the night. MET  2.  Restore normal pelvic alignment without needing MET for at least 2 weeks to improve pt's ability to dress his LE's and sleep through the night without pain. MET   3.  Pt is independent with HEP for self management of symptoms.  MET  4.  Increase pt's upper abdominal and trunk strength to 3+-4/5 for improved posture and to decompress the lumbar spine.    MET      Recommendations: Discharge    PT Signature: Jannie Arana, PT    Jerman Block, Student PT        Manual Therapy:    10     mins  55230;  Therapeutic Exercise:    20     mins  01381;     Neuromuscular Sunday:        mins  66222;    Therapeutic Activity:          mins  26850;     Gait Training:           mins  27637;     Ultrasound:     8     mins  20850;    Electrical Stimulation:         mins  35150 ( );  Dry Needling          mins self-pay    Timed Treatment:   38   mins   Total Treatment:     45   mins

## 2024-06-27 DIAGNOSIS — E78.2 MIXED HYPERLIPIDEMIA: ICD-10-CM

## 2024-06-27 RX ORDER — ATORVASTATIN CALCIUM 40 MG/1
40 TABLET, FILM COATED ORAL DAILY
Qty: 90 TABLET | Refills: 3 | OUTPATIENT
Start: 2024-06-27

## 2024-07-30 DIAGNOSIS — E78.2 MIXED HYPERLIPIDEMIA: ICD-10-CM

## 2024-07-30 RX ORDER — ATORVASTATIN CALCIUM 40 MG/1
40 TABLET, FILM COATED ORAL DAILY
Qty: 90 TABLET | Refills: 3 | OUTPATIENT
Start: 2024-07-30